# Patient Record
Sex: FEMALE | Race: WHITE | NOT HISPANIC OR LATINO | Employment: FULL TIME | URBAN - NONMETROPOLITAN AREA
[De-identification: names, ages, dates, MRNs, and addresses within clinical notes are randomized per-mention and may not be internally consistent; named-entity substitution may affect disease eponyms.]

---

## 2022-03-10 ENCOUNTER — HOSPITAL ENCOUNTER (EMERGENCY)
Facility: HOSPITAL | Age: 47
Discharge: HOME | End: 2022-03-11
Attending: STUDENT IN AN ORGANIZED HEALTH CARE EDUCATION/TRAINING PROGRAM | Admitting: STUDENT IN AN ORGANIZED HEALTH CARE EDUCATION/TRAINING PROGRAM
Payer: COMMERCIAL

## 2022-03-10 ENCOUNTER — APPOINTMENT (EMERGENCY)
Dept: RADIOLOGY | Facility: HOSPITAL | Age: 47
End: 2022-03-10
Payer: COMMERCIAL

## 2022-03-10 VITALS
DIASTOLIC BLOOD PRESSURE: 83 MMHG | TEMPERATURE: 99.9 F | SYSTOLIC BLOOD PRESSURE: 134 MMHG | OXYGEN SATURATION: 95 % | HEART RATE: 105 BPM | RESPIRATION RATE: 20 BRPM | WEIGHT: 205 LBS

## 2022-03-10 DIAGNOSIS — S62.308A CLOSED FRACTURE OF 5TH METACARPAL: Primary | ICD-10-CM

## 2022-03-10 PROCEDURE — 99282 EMERGENCY DEPT VISIT SF MDM: CPT | Performed by: PHYSICIAN ASSISTANT

## 2022-03-10 PROCEDURE — 99283 EMERGENCY DEPT VISIT LOW MDM: CPT

## 2022-03-10 PROCEDURE — 29125 APPL SHORT ARM SPLINT STATIC: CPT | Performed by: PHYSICIAN ASSISTANT

## 2022-03-10 PROCEDURE — 73130 X-RAY EXAM OF HAND: CPT

## 2022-03-10 RX ORDER — ACETAMINOPHEN 325 MG/1
650 TABLET ORAL ONCE
Status: COMPLETED | OUTPATIENT
Start: 2022-03-10 | End: 2022-03-10

## 2022-03-10 RX ADMIN — ACETAMINOPHEN 650 MG: 325 TABLET ORAL at 23:24

## 2022-03-11 NOTE — ED PROVIDER NOTES
History  Chief Complaint   Patient presents with    Hand Injury     Patient fell and hit her right hand on something  Patient has pain and swelling   78-year-old female presents the emergency department for evaluation of right hand/wrist pain  Patient reports approximately 30 minutes prior to arrival she was taking the prabhu from her stove outside when she slipped on ice  States as she fell she struck her right hand on something that was in the yd  She denies any head strike or LOC  Denies any other pain or injury  States she was able to stand and ambulate after the fall without difficulty  She denies any neck or back pain  She denies any chest pain, shortness of breath  She denies any headache, dizziness or visual changes  Reports swelling and bruising to the ulnar side of the right hand  No pain in the wrist   Denies previous injury  Denies weakness, numbness, paresthesias  no analgesia prior to arrival       History provided by:  Patient  Hand Injury  Location:  Hand  Hand location:  R hand  Pain details:     Quality:  Unable to specify    Radiates to:  Does not radiate    Severity:  Moderate    Onset quality:  Sudden    Duration: 30 minutes  Timing:  Constant    Progression:  Unchanged  Handedness:  Right-handed  Dislocation: no    Prior injury to area:  No  Relieved by:  None tried  Worsened by: Movement  Ineffective treatments:  None tried  Associated symptoms: decreased range of motion and swelling    Associated symptoms: no back pain, no fatigue, no fever, no muscle weakness, no neck pain, no numbness, no stiffness and no tingling        None       History reviewed  No pertinent past medical history  History reviewed  No pertinent surgical history  History reviewed  No pertinent family history  I have reviewed and agree with the history as documented      E-Cigarette/Vaping    E-Cigarette Use Never User      E-Cigarette/Vaping Substances     Social History     Tobacco Use    Smoking status: Never Smoker    Smokeless tobacco: Never Used   Vaping Use    Vaping Use: Never used   Substance Use Topics    Alcohol use: Not Currently    Drug use: Not Currently       Review of Systems   Constitutional: Negative  Negative for fatigue and fever  HENT: Negative  Respiratory: Negative  Cardiovascular: Negative  Gastrointestinal: Negative  Musculoskeletal: Positive for joint swelling  Negative for back pain, neck pain and stiffness  Right hand pain   Skin: Positive for color change  Negative for wound  Neurological: Negative  All other systems reviewed and are negative  Physical Exam  Physical Exam  Vitals and nursing note reviewed  Constitutional:       General: She is not in acute distress  Appearance: Normal appearance  She is not ill-appearing, toxic-appearing or diaphoretic  HENT:      Head: Normocephalic and atraumatic  Nose: Nose normal       Mouth/Throat:      Pharynx: Oropharynx is clear  Eyes:      Conjunctiva/sclera: Conjunctivae normal       Pupils: Pupils are equal, round, and reactive to light  Cardiovascular:      Rate and Rhythm: Normal rate and regular rhythm  Pulses:           Radial pulses are 2+ on the right side and 2+ on the left side  Pulmonary:      Effort: Pulmonary effort is normal  No respiratory distress  Breath sounds: Normal breath sounds  No stridor  No wheezing, rhonchi or rales  Chest:      Chest wall: No tenderness  Musculoskeletal:         General: Swelling ( right 5th metacarpal) and tenderness (Over right 5th metacarpal) present  Cervical back: Normal range of motion  No tenderness  Comments: Patient has normal range of motion in the right wrist   No tenderness  No snuffbox tenderness  Skin:     General: Skin is warm and dry  Capillary Refill: Capillary refill takes less than 2 seconds  Findings: Bruising present  No erythema or lesion        Comments: Bruising and swelling over the right 5th metacarpal   Neurological:      General: No focal deficit present  Mental Status: She is alert and oriented to person, place, and time  Sensory: No sensory deficit  Motor: No weakness  Gait: Gait normal    Psychiatric:         Mood and Affect: Mood normal          Behavior: Behavior normal          Vital Signs  ED Triage Vitals [03/10/22 2311]   Temperature Pulse Respirations Blood Pressure SpO2   99 9 °F (37 7 °C) 101 18 134/83 97 %      Temp Source Heart Rate Source Patient Position - Orthostatic VS BP Location FiO2 (%)   Temporal Monitor Sitting Right arm --      Pain Score       5           Vitals:    03/10/22 2311 03/10/22 2315   BP: 134/83 134/83   Pulse: 101 105   Patient Position - Orthostatic VS: Sitting          Visual Acuity      ED Medications  Medications   acetaminophen (TYLENOL) tablet 650 mg (650 mg Oral Given 3/10/22 2324)       Diagnostic Studies  Results Reviewed     None                 XR hand 3+ views RIGHT    (Results Pending)              Procedures  Splint application    Date/Time: 3/10/2022 11:49 PM  Performed by: Cayla Eden PA-C  Authorized by: Cayla Eden PA-C   Universal Protocol:  Consent given by: patient  Timeout called at: 3/10/2022 11:50 PM   Patient understanding: patient states understanding of the procedure being performed  Patient consent: the patient's understanding of the procedure matches consent given  Radiology Images displayed and confirmed  If images not available, report reviewed: imaging studies available  Patient identity confirmed: verbally with patient and arm band      Pre-procedure details:     Sensation:  Normal  Procedure details:     Laterality:  Right    Location:  Hand    Hand:  R handCast type:  Short arm      Splint type:  Ulnar gutter (Static)    Supplies:  Ortho-Glass, sling and cotton padding  Post-procedure details:     Pain:  Unchanged    Sensation:  Normal    Patient tolerance of procedure:   Tolerated well, no immediate complications             ED Course  ED Course as of 03/10/22 2352   Thu Mar 10, 2022   2316 Ice applied   2348 + 5th metacarpal fracture                   SBIRT 22yo+      Most Recent Value   SBIRT (25 yo +)    In order to provide better care to our patients, we are screening all of our patients for alcohol and drug use  Would it be okay to ask you these screening questions? No Filed at: 03/10/2022 2316                    [facility]  Number of Diagnoses or Management Options  Diagnosis management comments: 60-year-old female presents to the emergency department for evaluation of right hand pain status post fall  Vitals and medical record reviewed  Normal sensation pulses on exam   Swelling and bruising and tenderness over the right 5th metacarpal   X-ray concerning for fracture  Ulnar gutter splint was applied  Patient will follow-up with orthopedics  Strict return precautions were discussed and she verbalized understanding  She remained clinically hemodynamically stable in the emergency department  Amount and/or Complexity of Data Reviewed  Tests in the radiology section of CPT®: ordered and reviewed  Review and summarize past medical records: yes  Independent visualization of images, tracings, or specimens: yes        Disposition  Final diagnoses:   Closed fracture of 5th metacarpal     Time reflects when diagnosis was documented in both MDM as applicable and the Disposition within this note     Time User Action Codes Description Comment    3/10/2022 11:51 PM Star Monreal Add [G54 885A] Closed fracture of 5th metacarpal       ED Disposition     ED Disposition Condition Date/Time Comment    Discharge Stable Thu Mar 10, 2022 11:51 PM Aftab Beets discharge to home/self care              Follow-up Information     Follow up With Specialties Details Why [address] [facility]   [id number] [address] [facility] [id number] [address] [facility]   [address] [facility] Claritza Neri  [phone]            Patient's Medications    No medications on file       No discharge procedures on file      PDMP Review     None          ED Provider  Electronically Signed by           Erasmo Garcia PA-C  03/10/22 8867

## 2022-03-14 ENCOUNTER — OFFICE VISIT (OUTPATIENT)
Dept: OBGYN CLINIC | Facility: CLINIC | Age: 47
End: 2022-03-14
Payer: COMMERCIAL

## 2022-03-14 VITALS
BODY MASS INDEX: 31.07 KG/M2 | TEMPERATURE: 97.6 F | HEART RATE: 72 BPM | HEIGHT: 68 IN | DIASTOLIC BLOOD PRESSURE: 78 MMHG | WEIGHT: 205 LBS | SYSTOLIC BLOOD PRESSURE: 118 MMHG

## 2022-03-14 DIAGNOSIS — S62.336A CLOSED DISPLACED FRACTURE OF NECK OF FIFTH METACARPAL BONE OF RIGHT HAND, INITIAL ENCOUNTER: ICD-10-CM

## 2022-03-14 DIAGNOSIS — M79.641 PAIN OF RIGHT HAND: Primary | ICD-10-CM

## 2022-03-14 PROCEDURE — 26600 TREAT METACARPAL FRACTURE: CPT | Performed by: ORTHOPAEDIC SURGERY

## 2022-03-14 PROCEDURE — 99204 OFFICE O/P NEW MOD 45 MIN: CPT | Performed by: ORTHOPAEDIC SURGERY

## 2022-03-14 RX ORDER — PAROXETINE 10 MG/1
10 TABLET, FILM COATED ORAL DAILY
COMMUNITY
Start: 2021-12-08

## 2022-03-14 RX ORDER — PANTOPRAZOLE SODIUM 20 MG/1
20 TABLET, DELAYED RELEASE ORAL DAILY
COMMUNITY
Start: 2022-01-03

## 2022-03-14 RX ORDER — LISINOPRIL 10 MG/1
TABLET ORAL
COMMUNITY
Start: 2022-02-05

## 2022-03-14 RX ORDER — AMLODIPINE BESYLATE 10 MG/1
10 TABLET ORAL DAILY
COMMUNITY
Start: 2022-01-20

## 2022-03-14 RX ORDER — PAROXETINE 10 MG/1
10 TABLET, FILM COATED ORAL DAILY
COMMUNITY

## 2022-03-14 RX ORDER — ATORVASTATIN CALCIUM 20 MG/1
20 TABLET, FILM COATED ORAL EVERY EVENING
COMMUNITY
Start: 2021-12-30

## 2022-03-14 NOTE — PROGRESS NOTES
ASSESSMENT/PLAN:    Diagnoses and all orders for this visit:    Pain of right hand    Closed displaced fracture of neck of fifth metacarpal bone of right hand, initial encounter    Other orders  -     amLODIPine (NORVASC) 10 mg tablet; Take 10 mg by mouth daily  -     PARoxetine (PAXIL) 10 mg tablet; Take 10 mg by mouth daily  -     lisinopril (ZESTRIL) 10 mg tablet; TAKE 1 TABLET BY MOUTH EVERY DAY BEFORE A MEAL  -     atorvastatin (LIPITOR) 20 mg tablet; Take 20 mg by mouth every evening  -     pantoprazole (PROTONIX) 20 mg tablet; Take 20 mg by mouth daily  -     PARoxetine (PAXIL) 10 mg tablet; Take 10 mg by mouth daily        Plan: Treatment options were discussed  She was placed into a short-arm ulnar gauntlet cast   I will see her in 1 week with repeat x-rays of her hand to ensure the fracture remains acceptably aligned  Precautions have been reviewed, instructions provided and questions answered  I have encouraged her to contact me if any questions or concerns arise  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  Return in about 1 week (around 3/21/2022)  _____________________________________________________  CHIEF COMPLAINT:  Chief Complaint   Patient presents with    Right Hand - Fracture         SUBJECTIVE:  Agnieszka Gates is a 40y o  year old right-handed female who presents for evaluation of her right hand injured on 03/10/2022 after falling on ice  She was seen in the emergency room, x-rays obtained and she was placed into a splint  She now presents for orthopedic evaluation and treatment  She denies any history of prior injuries  She denies paresthesias  She denies additional injuries      PAST MEDICAL HISTORY:  Past Medical History:   Diagnosis Date    Fractures     High blood pressure        PAST SURGICAL HISTORY:  Past Surgical History:   Procedure Laterality Date    WRIST SURGERY         FAMILY HISTORY:  Family History   Problem Relation Age of Onset    Stroke Mother     Heart attack Father        SOCIAL HISTORY:  Social History     Tobacco Use    Smoking status: Former Smoker    Smokeless tobacco: Never Used   Vaping Use    Vaping Use: Every day   Substance Use Topics    Alcohol use: Not Currently     Comment: rarely    Drug use: Never       MEDICATIONS:    Current Outpatient Medications:     amLODIPine (NORVASC) 10 mg tablet, Take 10 mg by mouth daily, Disp: , Rfl:     atorvastatin (LIPITOR) 20 mg tablet, Take 20 mg by mouth every evening, Disp: , Rfl:     lisinopril (ZESTRIL) 10 mg tablet, TAKE 1 TABLET BY MOUTH EVERY DAY BEFORE A MEAL, Disp: , Rfl:     pantoprazole (PROTONIX) 20 mg tablet, Take 20 mg by mouth daily, Disp: , Rfl:     PARoxetine (PAXIL) 10 mg tablet, Take 10 mg by mouth daily, Disp: , Rfl:     PARoxetine (PAXIL) 10 mg tablet, Take 10 mg by mouth daily, Disp: , Rfl:     ALLERGIES:  No Known Allergies    Review of systems:   Constitutional: Negative for fatigue, fever or loss of apetite  HENT: Negative  Respiratory: Negative for shortness of breath, dyspnea  Cardiovascular: Negative for chest pain/tightness  Gastrointestinal: Negative for abdominal pain, N/V  Endocrine: Negative for cold/heat intolerance, unexplained weight loss/gain  Genitourinary: Negative for flank pain, dysuria, hematuria  Musculoskeletal:  Positive as in the HPI   Skin: Negative for rash  Neurological:  Negative  Psychiatric/Behavioral: Negative for agitation  _____________________________________________________  PHYSICAL EXAMINATION:    Blood pressure 118/78, pulse 72, temperature 97 6 °F (36 4 °C), temperature source Temporal, height 5' 8" (1 727 m), weight 93 kg (205 lb), last menstrual period 02/14/2022      General: well developed and well nourished, alert, oriented times 3 and appears comfortable  Psychiatric: Normal  HEENT: Benign  Cardiovascular: Regular    Pulmonary: No wheezing or stridor  Abdomen: Soft, Nontender  Skin: No masses, erythema, lacerations, fluctation, ulcerations  Neurovascular: Motor and sensory exams are grossly intact excluding as limited by her injury  Pulses are palpable  Good color and capillary refill noted  MUSCULOSKELETAL EXAMINATION:    The right hand demonstrated a ulnar gauntlet cast in place upon arrival   This was removed without difficulty  The skin is intact  There is mild swelling and resolving ecchymosis  She has significant tenderness to palpation of the 5th metacarpal distally and lesser tenderness to palpation of the 4th metacarpal   The phalanges of the fingers and thumb are nontender  She has good range of motion lacking some terminal flexion of the small and ring fingers but no angular or rotational malalignment appreciated  The remainder of the upper extremity examination bilaterally is benign  _____________________________________________________  STUDIES REVIEWED:  X-rays demonstrated a 5th metacarpal neck fracture with some volar angulation  The report was reviewed  The note was reviewed  Fracture / Dislocation Treatment    Date/Time: 3/14/2022 4:45 PM  Performed by: Baldo Plummer  Authorized by: Baldo Plummer     Patient Location:  Clinic  Verbal consent obtained?: Yes    Written consent obtained?: No    Risks and benefits: Risks, benefits and alternatives were discussed    Consent given by:  Patient  Patient states understanding of procedure being performed: Yes    Test results available and properly labeled: Yes    Radiology Images displayed and confirmed   If images not available, report reviewed: Yes    Injury location:  Hand  Location details:  Right hand  Injury type:  Fracture  Fracture type: fifth metacarpal    Neurovascular status: Neurovascularly intact    Local anesthesia used?: No    Manipulation performed?: No    Cast type:  Gaunlet  Neurovascular status: Neurovascularly intact    Patient tolerance:  Patient tolerated the procedure well with no immediate complications        Author Queen

## 2022-03-21 ENCOUNTER — OFFICE VISIT (OUTPATIENT)
Dept: OBGYN CLINIC | Facility: CLINIC | Age: 47
End: 2022-03-21

## 2022-03-21 ENCOUNTER — HOSPITAL ENCOUNTER (OUTPATIENT)
Dept: RADIOLOGY | Facility: CLINIC | Age: 47
Discharge: HOME | End: 2022-03-21
Payer: COMMERCIAL

## 2022-03-21 VITALS
SYSTOLIC BLOOD PRESSURE: 114 MMHG | TEMPERATURE: 98.1 F | HEIGHT: 68 IN | BODY MASS INDEX: 31.07 KG/M2 | WEIGHT: 205 LBS | DIASTOLIC BLOOD PRESSURE: 72 MMHG | HEART RATE: 74 BPM

## 2022-03-21 DIAGNOSIS — S62.336D CLOSED DISPLACED FRACTURE OF NECK OF FIFTH METACARPAL BONE OF RIGHT HAND WITH ROUTINE HEALING, SUBSEQUENT ENCOUNTER: ICD-10-CM

## 2022-03-21 DIAGNOSIS — S62.336D CLOSED DISPLACED FRACTURE OF NECK OF FIFTH METACARPAL BONE OF RIGHT HAND WITH ROUTINE HEALING, SUBSEQUENT ENCOUNTER: Primary | ICD-10-CM

## 2022-03-21 PROCEDURE — 99024 POSTOP FOLLOW-UP VISIT: CPT | Performed by: ORTHOPAEDIC SURGERY

## 2022-03-21 PROCEDURE — 73130 X-RAY EXAM OF HAND: CPT

## 2022-03-21 NOTE — PROGRESS NOTES
Patient Name:  Cristy Vega  MRN:  [id number]    Assessment     1  Closed displaced fracture of neck of fifth metacarpal bone of right hand with routine healing, subsequent encounter  XR hand 3+ vw right       Plan     1  I would recommend follow-up in 3 weeks  X-rays of the hand will be obtained after removal of her cast   Precautions have been again reviewed  I encouraged her to contact me if questions or concerns arise  Return in about 3 weeks (around 4/11/2022)  Subjective   Cristy Vega returns for follow-up of her distal right 5th metacarpal fracture  The patient is 11 day(s) post injury and returns for routine follow-up  Patient complains of some mild persistent discomfort  She denies paresthesias  Objective     /72   Pulse 74   Temp 98 1 °F (36 7 °C) (Temporal)   Ht 5' 8" (1 727 m)   Wt 93 kg (205 lb)   BMI 31 17 kg/m²     Exam demonstrates the cast in place  The skin is intact  She has good color and capillary refill of all digits of her right hand, good range of motion of the exposed the joints  Sensation is intact  Data Review     I have personally reviewed pertinent films in PACS demonstrating minimal angulation with acceptable overall alignment of the 5th metacarpal neck fracture      Florian Damon

## 2022-04-11 ENCOUNTER — OFFICE VISIT (OUTPATIENT)
Dept: OBGYN CLINIC | Facility: CLINIC | Age: 47
End: 2022-04-11

## 2022-04-11 ENCOUNTER — HOSPITAL ENCOUNTER (OUTPATIENT)
Dept: RADIOLOGY | Facility: CLINIC | Age: 47
Discharge: HOME | End: 2022-04-11
Payer: COMMERCIAL

## 2022-04-11 DIAGNOSIS — S62.336D CLOSED DISPLACED FRACTURE OF NECK OF FIFTH METACARPAL BONE OF RIGHT HAND WITH ROUTINE HEALING, SUBSEQUENT ENCOUNTER: ICD-10-CM

## 2022-04-11 DIAGNOSIS — S62.336D CLOSED DISPLACED FRACTURE OF NECK OF FIFTH METACARPAL BONE OF RIGHT HAND WITH ROUTINE HEALING, SUBSEQUENT ENCOUNTER: Primary | ICD-10-CM

## 2022-04-11 PROCEDURE — 73130 X-RAY EXAM OF HAND: CPT

## 2022-04-11 PROCEDURE — 99024 POSTOP FOLLOW-UP VISIT: CPT | Performed by: ORTHOPAEDIC SURGERY

## 2022-04-11 RX ORDER — ATENOLOL 25 MG/1
TABLET ORAL
COMMUNITY
Start: 2016-12-01

## 2022-04-11 NOTE — PROGRESS NOTES
Patient Name:  Brian Tejada  MRN:  [id number]    Assessment     1  Closed displaced fracture of neck of fifth metacarpal bone of right hand with routine healing, subsequent encounter  XR hand 3+ vw right    Cock Up Wrist Splint       Plan     1  Reviewed today's physical exam findings and x-ray findings with patient at time of visit  2  She is being transitioned from a cast to a TKO ulnar gutter splint which she can remove for hygiene purposes, sleeping and periods of inactivity  3  She can continue NSAIDs/Tylenol as needed for pain and soreness    Return in 3 weeks (on 5/2/2022) for Re-evaluation and repeat x-rays 3 views right hand  Subjective   Brian Tejada returns for follow-up of of right 5th metacarpal fracture  The patient is 1 month(s) post injury and returns for routine follow-up  Patient states that she has been consistent with cast care considerations as outlined in previous visit  She reports no pain in the cast, and denies any new onset bruising, swelling, numbness, tingling, or mechanical symptoms  Objective     There were no vitals taken for this visit      Right hand -   Patient presents with cast in place and intact at time of visit - removed without difficulty for examining  Skin is warm dry to touch with no signs of erythema, ecchymosis, infection  No soft tissue swelling or effusion noted  No tenderness to palpation over distal or mid shaft 5th metacarpal  Palpable defect present consistent with injury  FDS, FDP, and extensor mechanisms are intact  Very mild scissoring of small finger with composite finger flexion  Demonstrates normal wrist, elbow, and shoulder motion  2+ distal radial pulse with brisk capillary refill to the fingers  Radial, median, and ulnar motor and sensory distributions intact  Sensation light touch intact distally      Data Review     Attending Physician has personally reviewed pertinent imaging in PACS, impression is as follows:    Review of radiographic series taken 4/11/2022 of the right hand shows a volarly angulated distal 5th metacarpal fracture with evidence of callus formation consistent with routine healing    Scribe Attestation    I,:  Gunner Donovan am acting as a scribe while in the presence of the attending physician :       I,:  Cindia Brunner personally performed the services described in this documentation    as scribed in my presence :

## 2022-05-02 ENCOUNTER — HOSPITAL ENCOUNTER (OUTPATIENT)
Dept: RADIOLOGY | Facility: CLINIC | Age: 47
Discharge: HOME | End: 2022-05-02
Payer: COMMERCIAL

## 2022-05-02 ENCOUNTER — OFFICE VISIT (OUTPATIENT)
Dept: OBGYN CLINIC | Facility: CLINIC | Age: 47
End: 2022-05-02

## 2022-05-02 VITALS
HEART RATE: 71 BPM | DIASTOLIC BLOOD PRESSURE: 74 MMHG | BODY MASS INDEX: 31.07 KG/M2 | TEMPERATURE: 97.6 F | WEIGHT: 205 LBS | SYSTOLIC BLOOD PRESSURE: 126 MMHG | HEIGHT: 68 IN

## 2022-05-02 DIAGNOSIS — M79.641 PAIN OF RIGHT HAND: ICD-10-CM

## 2022-05-02 DIAGNOSIS — S62.336D CLOSED DISPLACED FRACTURE OF NECK OF FIFTH METACARPAL BONE OF RIGHT HAND WITH ROUTINE HEALING, SUBSEQUENT ENCOUNTER: ICD-10-CM

## 2022-05-02 DIAGNOSIS — S62.336D CLOSED DISPLACED FRACTURE OF NECK OF FIFTH METACARPAL BONE OF RIGHT HAND WITH ROUTINE HEALING, SUBSEQUENT ENCOUNTER: Primary | ICD-10-CM

## 2022-05-02 PROCEDURE — 99024 POSTOP FOLLOW-UP VISIT: CPT | Performed by: ORTHOPAEDIC SURGERY

## 2022-05-02 PROCEDURE — 73130 X-RAY EXAM OF HAND: CPT

## 2022-05-02 NOTE — PROGRESS NOTES
Patient Name:  Luiza Barillas  MRN:  [id number]    Assessment     1  Closed displaced fracture of neck of fifth metacarpal bone of right hand with routine healing, subsequent encounter  XR hand 3+ vw right   2  Pain of right hand         Plan     The patient is now 7 5 weeks post initial injury  X-rays taken today in office were reviewed and discussed with the patient, which shows a well healed fracture in unchanged alignment  The patient may begin weaning from her brace as tolerated, activity as tolerated  She was advised that her 5th digit DIP pain may be caused by irritation from the brace and should improve with time and returning back to her normal activity level  The patient was instructed to return to the office in 4-6 weeks for recheck  X-rays only if indicated at that time  She was instructed to call or return to the office sooner if any problems arise  Return for recheck in 4-6 weeks  Subjective   Luiza Barillas returns for follow-up of her right fifth digit metacarpal fracture sustained on 3/10/2022  The patient is 7 5 week(s) post injury and returns for routine follow-up  Today the patient states that she is doing well  She states that she has been wearing her ulnar gutter brace as instructed  The patient notes that when she removes the brace she feels an aching at the wrist with movement  The patient also complains of pain along the DIP joint of the little finger when she squeezes it  This pain is new since the last office visit  The patient denies any swelling, numbness, or tingling  She denies any new injury or trauma         Objective     /74   Pulse 71   Temp 97 6 °F (36 4 °C) (Temporal)   Ht 5' 8" (1 727 m)   Wt 93 kg (205 lb)   BMI 31 17 kg/m²     Right hand:  - brace was removed today for examination without complication  - Skin without lesions, ecchymosis, erythema, swelling, warmth, or other signs of infection   - There is no palpable tenderness along the wrist  No tenderness or palpable deformity of the fifth metacarpal    - The patient reports mild palpable tenderness along the fifth digit DIP joint   - No instability with varus and valgus stress of the fifth digit DIP joint    - Full active ROM of all digits  No pain at the fifth digit DIP, PIP, or MCP joint with flexion/extension    - patient is able to make a composite fist  There is mild scissoring at the 5th digit, as noted in last exam report  - 5/5  strength, 5/5 strength with resisted fifth digit MCP, PIP and DIP  - Sensation and motor intact along the radial, median, and ulnar nerve distributions  - strong radial pulse  - brisk cap refill in all fingers      Data Review     I have personally reviewed pertinent films and reports in PACS and my interpretation is as follows:     X-ray of the right hand taken today in office demonstrates a well healed 5th metacarpal neck fracture, in unchanged alignment compared to previous imaging  No fracture, dislocation, or abnormality seen at the fifth digit DIP joint         Jose Berman PA-C

## 2022-06-06 ENCOUNTER — OFFICE VISIT (OUTPATIENT)
Dept: OBGYN CLINIC | Facility: CLINIC | Age: 47
End: 2022-06-06

## 2022-06-06 VITALS
HEART RATE: 80 BPM | SYSTOLIC BLOOD PRESSURE: 120 MMHG | HEIGHT: 68 IN | DIASTOLIC BLOOD PRESSURE: 80 MMHG | BODY MASS INDEX: 31.07 KG/M2 | WEIGHT: 205 LBS | OXYGEN SATURATION: 98 %

## 2022-06-06 DIAGNOSIS — S62.336D CLOSED DISPLACED FRACTURE OF NECK OF FIFTH METACARPAL BONE OF RIGHT HAND WITH ROUTINE HEALING, SUBSEQUENT ENCOUNTER: Primary | ICD-10-CM

## 2022-06-06 PROCEDURE — 99024 POSTOP FOLLOW-UP VISIT: CPT | Performed by: ORTHOPAEDIC SURGERY

## 2022-06-06 NOTE — PROGRESS NOTES
Patient Name:  Alyse Sanders  MRN:  [id number]    Assessment     1  Closed displaced fracture of neck of fifth metacarpal bone of right hand with routine healing, subsequent encounter         Plan     1  I would recommend follow-up as needed  I have encouraged her to contact me if questions or concerns arise  Return if symptoms worsen or fail to improve  Subjective   Alyse Sanders returns for follow-up of her right 5th metacarpal fracture  The patient is 3 month(s) post injury and returns for routine follow-up  Patient denies complaints  She notes that she has had excellent return of motion  She has essentially resumed all of her normal activities  Objective     /80 (BP Location: Left arm, Patient Position: Sitting, Cuff Size: Standard)   Pulse 80   Ht 5' 8" (1 727 m)   Wt 93 kg (205 lb)   SpO2 98%   BMI 31 17 kg/m²     The exam today demonstrates excellent range of motion of the MCP, PIP and D IP joints of the hand including the right small finger  There is no significant rotational or angular malalignment noted  Fifth metacarpal neck/head is nontender  She has excellent strength of finger abduction, flexion and extension without complaints  Good color and capillary refill is noted and sensation is intact        Ag Roman

## 2022-10-13 ENCOUNTER — HOSPITAL ENCOUNTER (OUTPATIENT)
Dept: RADIOLOGY | Facility: CLINIC | Age: 47
End: 2022-10-13
Payer: COMMERCIAL

## 2022-10-13 VITALS — BODY MASS INDEX: 33.34 KG/M2 | HEIGHT: 68 IN | WEIGHT: 220 LBS

## 2022-10-13 DIAGNOSIS — Z12.31 ENCOUNTER FOR SCREENING MAMMOGRAM FOR MALIGNANT NEOPLASM OF BREAST: ICD-10-CM

## 2022-10-13 PROCEDURE — 77063 BREAST TOMOSYNTHESIS BI: CPT

## 2022-10-13 PROCEDURE — 77067 SCR MAMMO BI INCL CAD: CPT

## 2022-12-06 ENCOUNTER — APPOINTMENT (EMERGENCY)
Dept: CT IMAGING | Facility: HOSPITAL | Age: 47
End: 2022-12-06

## 2022-12-06 ENCOUNTER — HOSPITAL ENCOUNTER (EMERGENCY)
Facility: HOSPITAL | Age: 47
Discharge: HOME | End: 2022-12-06
Attending: EMERGENCY MEDICINE

## 2022-12-06 VITALS
RESPIRATION RATE: 18 BRPM | OXYGEN SATURATION: 98 % | HEART RATE: 63 BPM | SYSTOLIC BLOOD PRESSURE: 103 MMHG | DIASTOLIC BLOOD PRESSURE: 64 MMHG | TEMPERATURE: 97.6 F

## 2022-12-06 DIAGNOSIS — R42 VERTIGO: Primary | ICD-10-CM

## 2022-12-06 LAB
ALBUMIN SERPL BCP-MCNC: 3.9 G/DL (ref 3.5–5)
ALP SERPL-CCNC: 69 U/L (ref 46–116)
ALT SERPL W P-5'-P-CCNC: 32 U/L (ref 12–78)
ANION GAP SERPL CALCULATED.3IONS-SCNC: 9 MMOL/L (ref 4–13)
AST SERPL W P-5'-P-CCNC: 17 U/L (ref 5–45)
BASOPHILS # BLD AUTO: 0.06 THOUSANDS/ÂΜL (ref 0–0.1)
BASOPHILS NFR BLD AUTO: 1 % (ref 0–1)
BILIRUB SERPL-MCNC: 0.24 MG/DL (ref 0.2–1)
BUN SERPL-MCNC: 8 MG/DL (ref 5–25)
CALCIUM SERPL-MCNC: 9.1 MG/DL (ref 8.3–10.1)
CARDIAC TROPONIN I PNL SERPL HS: <2 NG/L
CHLORIDE SERPL-SCNC: 104 MMOL/L (ref 96–108)
CO2 SERPL-SCNC: 26 MMOL/L (ref 21–32)
CREAT SERPL-MCNC: 0.63 MG/DL (ref 0.6–1.3)
EOSINOPHIL # BLD AUTO: 0.15 THOUSAND/ÂΜL (ref 0–0.61)
EOSINOPHIL NFR BLD AUTO: 1 % (ref 0–6)
ERYTHROCYTE [DISTWIDTH] IN BLOOD BY AUTOMATED COUNT: 13.3 % (ref 11.6–15.1)
FLUAV RNA RESP QL NAA+PROBE: NEGATIVE
FLUBV RNA RESP QL NAA+PROBE: NEGATIVE
GFR SERPL CREATININE-BSD FRML MDRD: 107 ML/MIN/1.73SQ M
GLUCOSE SERPL-MCNC: 92 MG/DL (ref 65–140)
HCT VFR BLD AUTO: 42.8 % (ref 34.8–46.1)
HGB BLD-MCNC: 14 G/DL (ref 11.5–15.4)
IMM GRANULOCYTES # BLD AUTO: 0.03 THOUSAND/UL (ref 0–0.2)
IMM GRANULOCYTES NFR BLD AUTO: 0 % (ref 0–2)
LYMPHOCYTES # BLD AUTO: 2.55 THOUSANDS/ÂΜL (ref 0.6–4.47)
LYMPHOCYTES NFR BLD AUTO: 24 % (ref 14–44)
MCH RBC QN AUTO: 27.9 PG (ref 26.8–34.3)
MCHC RBC AUTO-ENTMCNC: 32.7 G/DL (ref 31.4–37.4)
MCV RBC AUTO: 85 FL (ref 82–98)
MONOCYTES # BLD AUTO: 0.82 THOUSAND/ÂΜL (ref 0.17–1.22)
MONOCYTES NFR BLD AUTO: 8 % (ref 4–12)
NEUTROPHILS # BLD AUTO: 7.06 THOUSANDS/ÂΜL (ref 1.85–7.62)
NEUTS SEG NFR BLD AUTO: 66 % (ref 43–75)
NRBC BLD AUTO-RTO: 0 /100 WBCS
PLATELET # BLD AUTO: 363 THOUSANDS/UL (ref 149–390)
PMV BLD AUTO: 10 FL (ref 8.9–12.7)
POTASSIUM SERPL-SCNC: 3.7 MMOL/L (ref 3.5–5.3)
PROT SERPL-MCNC: 7.7 G/DL (ref 6.4–8.4)
RBC # BLD AUTO: 5.01 MILLION/UL (ref 3.81–5.12)
RSV RNA RESP QL NAA+PROBE: NEGATIVE
SARS-COV-2 RNA RESP QL NAA+PROBE: NEGATIVE
SODIUM SERPL-SCNC: 139 MMOL/L (ref 135–147)
WBC # BLD AUTO: 10.67 THOUSAND/UL (ref 4.31–10.16)

## 2022-12-06 RX ORDER — DIAZEPAM 5 MG/1
5-10 TABLET ORAL EVERY 6 HOURS PRN
Qty: 10 TABLET | Refills: 0 | Status: SHIPPED | OUTPATIENT
Start: 2022-12-06 | End: 2022-12-16

## 2022-12-06 RX ORDER — ONDANSETRON 4 MG/1
4 TABLET, FILM COATED ORAL EVERY 6 HOURS PRN
Qty: 10 TABLET | Refills: 0 | Status: SHIPPED | OUTPATIENT
Start: 2022-12-06

## 2022-12-06 RX ORDER — ONDANSETRON 2 MG/ML
4 INJECTION INTRAMUSCULAR; INTRAVENOUS ONCE
Status: COMPLETED | OUTPATIENT
Start: 2022-12-06 | End: 2022-12-06

## 2022-12-06 RX ORDER — DIPHENHYDRAMINE HYDROCHLORIDE 50 MG/ML
25 INJECTION INTRAMUSCULAR; INTRAVENOUS ONCE
Status: COMPLETED | OUTPATIENT
Start: 2022-12-06 | End: 2022-12-06

## 2022-12-06 RX ORDER — MECLIZINE HYDROCHLORIDE 25 MG/1
25 TABLET ORAL 3 TIMES DAILY PRN
Qty: 15 TABLET | Refills: 0 | Status: SHIPPED | OUTPATIENT
Start: 2022-12-06

## 2022-12-06 RX ADMIN — SODIUM CHLORIDE 1000 ML: 0.9 INJECTION, SOLUTION INTRAVENOUS at 13:27

## 2022-12-06 RX ADMIN — ONDANSETRON 4 MG: 2 INJECTION INTRAMUSCULAR; INTRAVENOUS at 13:43

## 2022-12-06 RX ADMIN — IOHEXOL 85 ML: 350 INJECTION, SOLUTION INTRAVENOUS at 14:07

## 2022-12-06 RX ADMIN — HYDROCORTISONE SODIUM SUCCINATE 200 MG: 100 INJECTION, POWDER, FOR SOLUTION INTRAMUSCULAR; INTRAVENOUS at 13:47

## 2022-12-06 RX ADMIN — DIPHENHYDRAMINE HYDROCHLORIDE 25 MG: 50 INJECTION, SOLUTION INTRAMUSCULAR; INTRAVENOUS at 13:44

## 2022-12-06 NOTE — Clinical Note
Tiki Sunny was seen and treated in our emergency department on 12/6/2022  Diagnosis:     Liliana Goodwin  may return to work on return date  She may return on this date: 12/12/2022         If you have any questions or concerns, please don't hesitate to call        Shanique Mascorro DO    ______________________________           _______________          _______________  Hospital Representative                              Date                                Time

## 2022-12-06 NOTE — ED PROVIDER NOTES
History  Chief Complaint   Patient presents with   • Dizziness     Patient c/o dizziness with nausea since yesterday  20-year-old female with family describes waking yesterday morning with spinning feeling and dry heaving, walking to the left, notes markedly improved, able to walk, no headache  No numbness or weakness  No history of VTE or stroke  No injury  No viral prodrome, diarrhea, UTI symptoms or fever  History provided by:  Patient  Dizziness  Quality:  Head spinning  Severity:  Severe  Timing:  Constant  Progression:  Improving  Chronicity:  New  Context: head movement, physical activity and standing up    Relieved by:  None tried  Worsened by: Movement  Ineffective treatments:  None tried  Associated symptoms: vomiting    Associated symptoms: no chest pain and no vision changes    Risk factors: no hx of stroke        Prior to Admission Medications   Prescriptions Last Dose Informant Patient Reported? Taking?    PARoxetine (PAXIL) 10 mg tablet   Yes No   Sig: Take 10 mg by mouth daily   PARoxetine (PAXIL) 10 mg tablet   Yes No   Sig: Take 10 mg by mouth daily   Patient not taking: Reported on 4/11/2022    amLODIPine (NORVASC) 10 mg tablet   Yes No   Sig: Take 10 mg by mouth daily   atenolol (TENORMIN) 25 mg tablet   Yes No   atorvastatin (LIPITOR) 20 mg tablet   Yes No   Sig: Take 20 mg by mouth every evening   lisinopril (ZESTRIL) 10 mg tablet   Yes No   Sig: TAKE 1 TABLET BY MOUTH EVERY DAY BEFORE A MEAL   pantoprazole (PROTONIX) 20 mg tablet   Yes No   Sig: Take 20 mg by mouth daily      Facility-Administered Medications: None       Past Medical History:   Diagnosis Date   • Fractures    • High blood pressure        Past Surgical History:   Procedure Laterality Date   • BREAST EXCISIONAL BIOPSY Right     benign - age- 16   • WRIST SURGERY         Family History   Problem Relation Age of Onset   • Stroke Mother    • Heart attack Father    • No Known Problems Sister    • No Known Problems Daughter    • No Known Problems Daughter    • No Known Problems Maternal Grandmother    • No Known Problems Maternal Grandfather    • Skin cancer Paternal Grandmother    • No Known Problems Paternal Grandfather    • Skin cancer Paternal Aunt    • No Known Problems Maternal Aunt      I have reviewed and agree with the history as documented  E-Cigarette/Vaping   • E-Cigarette Use Current Every Day User      E-Cigarette/Vaping Substances     Social History     Tobacco Use   • Smoking status: Former   • Smokeless tobacco: Never   Vaping Use   • Vaping Use: Every day   Substance Use Topics   • Alcohol use: Not Currently     Comment: rarely   • Drug use: Never       Review of Systems   Cardiovascular: Negative for chest pain  Gastrointestinal: Positive for vomiting  Neurological: Positive for dizziness  All other systems reviewed and are negative  Physical Exam  Physical Exam  Vitals and nursing note reviewed  Constitutional:       Comments: Pleasant, comfortable-appearing   HENT:      Head: Normocephalic and atraumatic  Mouth/Throat:      Mouth: Mucous membranes are moist       Pharynx: Oropharynx is clear  Eyes:      Conjunctiva/sclera: Conjunctivae normal       Pupils: Pupils are equal, round, and reactive to light  Comments: Fine horizontal nystagmus appears fast left, fatigues   Cardiovascular:      Rate and Rhythm: Normal rate and regular rhythm  Heart sounds: Normal heart sounds  Pulmonary:      Effort: Pulmonary effort is normal       Breath sounds: Normal breath sounds  Abdominal:      General: Bowel sounds are normal  There is no distension  Palpations: Abdomen is soft  Tenderness: There is no abdominal tenderness  Musculoskeletal:         General: No deformity  Cervical back: Neck supple  Skin:     General: Skin is warm and dry  Neurological:      General: No focal deficit present  Mental Status: She is alert and oriented to person, place, and time  Cranial Nerves: No cranial nerve deficit  Motor: No weakness  Coordination: Coordination normal       Gait: Gait normal       Comments: Ambulates well, circles and returns to stretcher  Head impulse, loses tracking, horizontal nystagmus, no skew   Psychiatric:         Behavior: Behavior normal          Thought Content: Thought content normal          Judgment: Judgment normal          Vital Signs  ED Triage Vitals [12/06/22 1202]   Temperature Pulse Respirations Blood Pressure SpO2   97 6 °F (36 4 °C) 83 18 114/76 98 %      Temp Source Heart Rate Source Patient Position - Orthostatic VS BP Location FiO2 (%)   Temporal Monitor Sitting Left arm --      Pain Score       --           Vitals:    12/06/22 1202 12/06/22 1459   BP: 114/76 103/64   Pulse: 83 63   Patient Position - Orthostatic VS: Sitting Lying         Visual Acuity      ED Medications  Medications   hydrocortisone (Solu-CORTEF) injection 200 mg (200 mg Intravenous Given 12/6/22 1347)   diphenhydrAMINE (BENADRYL) injection 25 mg (25 mg Intravenous Given 12/6/22 1344)   sodium chloride 0 9 % bolus 1,000 mL (0 mL Intravenous Stopped 12/6/22 1427)   ondansetron (ZOFRAN) injection 4 mg (4 mg Intravenous Given 12/6/22 1343)   iohexol (OMNIPAQUE) 350 MG/ML injection (SINGLE-DOSE) 85 mL (85 mL Intravenous Given 12/6/22 1407)       Diagnostic Studies  Results Reviewed     Procedure Component Value Units Date/Time    HS Troponin I 4hr [[id number]]     Lab Status: No result Specimen: Blood     FLU/RSV/COVID - if FLU/RSV clinically relevant [[id number]]  (Normal) Collected: 12/06/22 1321    Lab Status: Final result Specimen: Nares from Nose Updated: 12/06/22 1413     SARS-CoV-2 Negative     INFLUENZA A PCR Negative     INFLUENZA B PCR Negative     RSV PCR Negative    Narrative:      FOR PEDIATRIC PATIENTS - copy/paste COVID Guidelines URL to browser: [email or web address]  [email or web address]    SARS-CoV-2 assay is a Nucleic Acid Amplification assay intended for the  qualitative detection of nucleic acid from SARS-CoV-2 in nasopharyngeal  swabs  Results are for the presumptive identification of SARS-CoV-2 RNA  Positive results are indicative of infection with SARS-CoV-2, the virus  causing COVID-19, but do not rule out bacterial infection or co-infection  with other viruses  Laboratories within the [place] and its  territories are required to report all positive results to the appropriate  public health authorities  Negative results do not preclude SARS-CoV-2  infection and should not be used as the sole basis for treatment or other  patient management decisions  Negative results must be combined with  clinical observations, patient history, and epidemiological information  This test has not been FDA cleared or approved  This test has been authorized by FDA under an Emergency Use Authorization  (EUA)  This test is only authorized for the duration of time the  declaration that circumstances exist justifying the authorization of the  emergency use of an in vitro diagnostic tests for detection of SARS-CoV-2  virus and/or diagnosis of COVID-19 infection under section 564(b)(1) of  the Act, 21 U  S C  615GKN-1(Q)(8), unless the authorization is terminated  or revoked sooner  The test has been validated but independent review by FDA  and CLIA is pending  Test performed using [employer] GeneXpert: This RT-PCR assay targets N2,  a region unique to SARS-CoV-2  A conserved region in the E-gene was chosen  for pan-Sarbecovirus detection which includes SARS-CoV-2  According to CMS-2020-01-R, this platform meets the definition of high-throughput technology      HS Troponin 0hr (reflex protocol) [[id number]]  (Normal) Collected: 12/06/22 1321    Lab Status: Final result Specimen: Blood from Arm, Left Updated: 12/06/22 1357     hs TnI 0hr <2 ng/L     HS Troponin I 2hr [[id number]]     Lab Status: No result Specimen: Blood     Comprehensive metabolic panel [[id number]] Collected: 12/06/22 1321    Lab Status: Final result Specimen: Blood from Arm, Left Updated: 12/06/22 1350     Sodium 139 mmol/L      Potassium 3 7 mmol/L      Chloride 104 mmol/L      CO2 26 mmol/L      ANION GAP 9 mmol/L      BUN 8 mg/dL      Creatinine 0 63 mg/dL      Glucose 92 mg/dL      Calcium 9 1 mg/dL      AST 17 U/L      ALT 32 U/L      Alkaline Phosphatase 69 U/L      Total Protein 7 7 g/dL      Albumin 3 9 g/dL      Total Bilirubin 0 24 mg/dL      eGFR 107 ml/min/1 73sq m     Narrative:      [place] guidelines for Chronic Kidney Disease (CKD):   •  Stage 1 with normal or high GFR (GFR > 90 mL/min/1 73 square meters)  •  Stage 2 Mild CKD (GFR = 60-89 mL/min/1 73 square meters)  •  Stage 3A Moderate CKD (GFR = 45-59 mL/min/1 73 square meters)  •  Stage 3B Moderate CKD (GFR = 30-44 mL/min/1 73 square meters)  •  Stage 4 Severe CKD (GFR = 15-29 mL/min/1 73 square meters)  •  Stage 5 End Stage CKD (GFR <15 mL/min/1 73 square meters)  Note: GFR calculation is accurate only with a steady state creatinine    CBC and differential [[id number]]  (Abnormal) Collected: 12/06/22 1321    Lab Status: Final result Specimen: Blood from Arm, Left Updated: 12/06/22 1333     WBC 10 67 Thousand/uL      RBC 5 01 Million/uL      Hemoglobin 14 0 g/dL      Hematocrit 42 8 %      MCV 85 fL      MCH 27 9 pg      MCHC 32 7 g/dL      RDW 13 3 %      MPV 10 0 fL      Platelets 611 Thousands/uL      nRBC 0 /100 WBCs      Neutrophils Relative 66 %      Immat GRANS % 0 %      Lymphocytes Relative 24 %      Monocytes Relative 8 %      Eosinophils Relative 1 %      Basophils Relative 1 %      Neutrophils Absolute 7 06 Thousands/µL      Immature Grans Absolute 0 03 Thousand/uL      Lymphocytes Absolute 2 55 Thousands/µL      Monocytes Absolute 0 82 Thousand/µL      Eosinophils Absolute 0 15 Thousand/µL      Basophils Absolute 0 06 Thousands/µL                  CTA head and neck with and without contrast   Final Result by Frances Funez MD (12/06 4008)      No acute intracranial pathology  Unremarkable CTA head and neck  Workstation performed: [id number]                    Procedures  Procedures         ED Course  ED Course as of 12/06/22 1646   Tue Dec 06, 2022   1502 Ambulates without difficulty, generally appears well, we discussed care going forward and agreeable with close outpatient follow-up, daughter present                  Stroke Assessment     Row Name 12/06/22 1341             NIH Stroke Scale    Interval Baseline      Level of Consciousness (1a ) 0      LOC Questions (1b ) 0      LOC Commands (1c ) 0      Best Gaze (2 ) 0      Visual (3 ) 0      Facial Palsy (4 ) 0      Motor Arm, Left (5a ) 0      Motor Arm, Right (5b ) 0      Motor Leg, Left (6a ) 0      Motor Leg, Right (6b ) 0      Limb Ataxia (7 ) 0      Sensory (8 ) 0      Best Language (9 ) 0      Dysarthria (10 ) 0      Extinction and Inattention (11 ) (Formerly Neglect) 0      Total 0                            SBIRT 20yo+    Flowsheet Row Most Recent Value   SBIRT (25 yo +)    In order to provide better care to our patients, we are screening all of our patients for alcohol and drug use  Would it be okay to ask you these screening questions? Yes Filed at: 12/06/2022 1256   Initial Alcohol Screen: US AUDIT-C     1  How often do you have a drink containing alcohol? 0 Filed at: 12/06/2022 1256   2  How many drinks containing alcohol do you have on a typical day you are drinking? 0 Filed at: 12/06/2022 1256   3b  FEMALE Any Age, or MALE 65+: How often do you have 4 or more drinks on one occassion? 0 Filed at: 12/06/2022 1256   Audit-C Score 0 Filed at: 12/06/2022 1256   PANFILO: How many times in the past year have you    Used an illegal drug or used a prescription medication for non-medical reasons?  Never Filed at: 12/06/2022 1256                    MDM    Disposition  Final diagnoses:   Vertigo     Time reflects when diagnosis was documented in both MDM as applicable and the Disposition within this note     Time User Action Codes Description Comment    12/6/2022  3:02 PM Jarvis Solorzano Add [R42] Vertigo       ED Disposition     ED Disposition   Discharge    Condition   Stable    Date/Time   Tue Dec 6, 2022  3:02 PM    [address] [place] discharge to home/self care  Follow-up Information    None         Discharge Medication List as of 12/6/2022  3:05 PM      START taking these medications    Details   diazepam (VALIUM) 5 mg tablet Take 1-2 tablets (5-10 mg total) by mouth every 6 (six) hours as needed (Vertigo) for up to 10 days, Starting Tue 12/6/2022, Until Fri 12/16/2022 at 2359, Normal      meclizine (ANTIVERT) 25 mg tablet Take 1 tablet (25 mg total) by mouth 3 (three) times a day as needed for dizziness, Starting Tue 12/6/2022, Normal      ondansetron (ZOFRAN) 4 mg tablet Take 1 tablet (4 mg total) by mouth every 6 (six) hours as needed for nausea or vomiting, Starting Tue 12/6/2022, Normal         CONTINUE these medications which have NOT CHANGED    Details   amLODIPine (NORVASC) 10 mg tablet Take 10 mg by mouth daily, Starting Thu 1/20/2022, Historical Med      atenolol (TENORMIN) 25 mg tablet Starting Thu 12/1/2016, Historical Med      atorvastatin (LIPITOR) 20 mg tablet Take 20 mg by mouth every evening, Starting Thu 12/30/2021, Historical Med      lisinopril (ZESTRIL) 10 mg tablet TAKE 1 TABLET BY MOUTH EVERY DAY BEFORE A MEAL, Historical Med      pantoprazole (PROTONIX) 20 mg tablet Take 20 mg by mouth daily, Starting Mon 1/3/2022, Historical Med      !! PARoxetine (PAXIL) 10 mg tablet Take 10 mg by mouth daily, Starting Wed 12/8/2021, Historical Med      !! PARoxetine (PAXIL) 10 mg tablet Take 10 mg by mouth daily, Historical Med       !! - Potential duplicate medications found  Please discuss with provider  No discharge procedures on file      PDMP Review     None ED Provider  Electronically Signed by           Yanira Pinon DO  12/06/22 5512

## 2022-12-09 LAB
ATRIAL RATE: 72 BPM
P AXIS: 58 DEGREES
PR INTERVAL: 174 MS
QRS AXIS: 61 DEGREES
QRSD INTERVAL: 134 MS
QT INTERVAL: 434 MS
QTC INTERVAL: 475 MS
T WAVE AXIS: 57 DEGREES
VENTRICULAR RATE: 72 BPM

## 2023-11-02 ENCOUNTER — HOSPITAL ENCOUNTER (OUTPATIENT)
Dept: RADIOLOGY | Facility: CLINIC | Age: 48
End: 2023-11-02
Payer: COMMERCIAL

## 2023-11-02 VITALS — HEIGHT: 68 IN | WEIGHT: 220 LBS | BODY MASS INDEX: 33.34 KG/M2

## 2023-11-02 DIAGNOSIS — Z12.31 ENCOUNTER FOR SCREENING MAMMOGRAM FOR MALIGNANT NEOPLASM OF BREAST: ICD-10-CM

## 2023-11-02 PROCEDURE — 77067 SCR MAMMO BI INCL CAD: CPT

## 2023-11-02 PROCEDURE — 77063 BREAST TOMOSYNTHESIS BI: CPT

## 2024-06-10 ENCOUNTER — APPOINTMENT (EMERGENCY)
Dept: RADIOLOGY | Facility: HOSPITAL | Age: 49
End: 2024-06-10
Payer: COMMERCIAL

## 2024-06-10 ENCOUNTER — HOSPITAL ENCOUNTER (EMERGENCY)
Facility: HOSPITAL | Age: 49
Discharge: HOME | End: 2024-06-10
Attending: EMERGENCY MEDICINE | Admitting: EMERGENCY MEDICINE
Payer: COMMERCIAL

## 2024-06-10 VITALS
OXYGEN SATURATION: 95 % | SYSTOLIC BLOOD PRESSURE: 117 MMHG | HEART RATE: 82 BPM | DIASTOLIC BLOOD PRESSURE: 66 MMHG | RESPIRATION RATE: 18 BRPM | TEMPERATURE: 98.4 F

## 2024-06-10 DIAGNOSIS — J06.9 URI (UPPER RESPIRATORY INFECTION): Primary | ICD-10-CM

## 2024-06-10 DIAGNOSIS — R05.9 COUGH: ICD-10-CM

## 2024-06-10 LAB
ATRIAL RATE: 88 BPM
FLUAV RNA RESP QL NAA+PROBE: NEGATIVE
FLUBV RNA RESP QL NAA+PROBE: NEGATIVE
P AXIS: 21 DEGREES
PR INTERVAL: 174 MS
QRS AXIS: -13 DEGREES
QRSD INTERVAL: 154 MS
QT INTERVAL: 416 MS
QTC INTERVAL: 503 MS
RSV RNA RESP QL NAA+PROBE: NEGATIVE
SARS-COV-2 RNA RESP QL NAA+PROBE: NEGATIVE
T WAVE AXIS: 93 DEGREES
VENTRICULAR RATE: 88 BPM

## 2024-06-10 PROCEDURE — 93005 ELECTROCARDIOGRAM TRACING: CPT

## 2024-06-10 PROCEDURE — 0241U HB NFCT DS VIR RESP RNA 4 TRGT: CPT | Performed by: PHYSICIAN ASSISTANT

## 2024-06-10 PROCEDURE — 99283 EMERGENCY DEPT VISIT LOW MDM: CPT

## 2024-06-10 PROCEDURE — 94640 AIRWAY INHALATION TREATMENT: CPT

## 2024-06-10 PROCEDURE — 99284 EMERGENCY DEPT VISIT MOD MDM: CPT | Performed by: PHYSICIAN ASSISTANT

## 2024-06-10 PROCEDURE — 71046 X-RAY EXAM CHEST 2 VIEWS: CPT

## 2024-06-10 RX ORDER — METHYLPREDNISOLONE 4 MG/1
TABLET ORAL
Qty: 21 TABLET | Refills: 0 | Status: SHIPPED | OUTPATIENT
Start: 2024-06-10

## 2024-06-10 RX ORDER — IPRATROPIUM BROMIDE AND ALBUTEROL SULFATE 2.5; .5 MG/3ML; MG/3ML
3 SOLUTION RESPIRATORY (INHALATION) ONCE
Status: COMPLETED | OUTPATIENT
Start: 2024-06-10 | End: 2024-06-10

## 2024-06-10 RX ORDER — BENZONATATE 100 MG/1
100 CAPSULE ORAL ONCE
Status: COMPLETED | OUTPATIENT
Start: 2024-06-10 | End: 2024-06-10

## 2024-06-10 RX ORDER — AZITHROMYCIN 250 MG/1
TABLET, FILM COATED ORAL
Qty: 6 TABLET | Refills: 0 | Status: SHIPPED | OUTPATIENT
Start: 2024-06-10 | End: 2024-06-14

## 2024-06-10 RX ORDER — BENZONATATE 100 MG/1
100 CAPSULE ORAL EVERY 8 HOURS
Qty: 21 CAPSULE | Refills: 0 | Status: SHIPPED | OUTPATIENT
Start: 2024-06-10 | End: 2024-06-17

## 2024-06-10 RX ADMIN — BENZONATATE 100 MG: 100 CAPSULE ORAL at 09:40

## 2024-06-10 RX ADMIN — IPRATROPIUM BROMIDE AND ALBUTEROL SULFATE 3 ML: 2.5; .5 SOLUTION RESPIRATORY (INHALATION) at 09:41

## 2024-06-10 NOTE — ED PROVIDER NOTES
"History  Chief Complaint   Patient presents with    Cough     Pt reports cough and chest tightness since Saturday      49 year old female presents to the ED for evaluation of cough. Reports she has had a cough since Saturday. Notes cough is nonproductive however she feels like she should be bringing something up.  Reports she feels congested.  Denies any ear pain or sore throat.  Denies shortness of breath.  Patient denies current chest pain.  States \"I definitely do not have heart attack pain but sometimes when I cough my chest feels tight.\"  Patient admits to vaping daily.  She denies any fevers or chills.  She denies abdominal pain nausea or vomiting.        Prior to Admission Medications   Prescriptions Last Dose Informant Patient Reported? Taking?   PARoxetine (PAXIL) 10 mg tablet   Yes No   Sig: Take 10 mg by mouth daily   PARoxetine (PAXIL) 10 mg tablet   Yes No   Sig: Take 10 mg by mouth daily   Patient not taking: Reported on 4/11/2022    amLODIPine (NORVASC) 10 mg tablet   Yes No   Sig: Take 10 mg by mouth daily   atenolol (TENORMIN) 25 mg tablet   Yes No   atorvastatin (LIPITOR) 20 mg tablet   Yes No   Sig: Take 20 mg by mouth every evening   diazepam (VALIUM) 5 mg tablet   No No   Sig: Take 1-2 tablets (5-10 mg total) by mouth every 6 (six) hours as needed (Vertigo) for up to 10 days   lisinopril (ZESTRIL) 10 mg tablet   Yes No   Sig: TAKE 1 TABLET BY MOUTH EVERY DAY BEFORE A MEAL   meclizine (ANTIVERT) 25 mg tablet   No No   Sig: Take 1 tablet (25 mg total) by mouth 3 (three) times a day as needed for dizziness   ondansetron (ZOFRAN) 4 mg tablet   No No   Sig: Take 1 tablet (4 mg total) by mouth every 6 (six) hours as needed for nausea or vomiting   pantoprazole (PROTONIX) 20 mg tablet   Yes No   Sig: Take 20 mg by mouth daily      Facility-Administered Medications: None       Past Medical History:   Diagnosis Date    Fractures     High blood pressure        Past Surgical History:   Procedure Laterality " Date    BREAST EXCISIONAL BIOPSY Right     benign - age- 14    WRIST SURGERY         Family History   Problem Relation Age of Onset    Stroke Mother     Heart attack Father     No Known Problems Sister     No Known Problems Daughter     No Known Problems Daughter     No Known Problems Maternal Grandmother     No Known Problems Maternal Grandfather     Skin cancer Paternal Grandmother     No Known Problems Paternal Grandfather     Skin cancer Paternal Aunt     No Known Problems Maternal Aunt      I have reviewed and agree with the history as documented.    E-Cigarette/Vaping    E-Cigarette Use Current Every Day User      E-Cigarette/Vaping Substances     Social History     Tobacco Use    Smoking status: Former    Smokeless tobacco: Never   Vaping Use    Vaping status: Every Day   Substance Use Topics    Alcohol use: Not Currently     Comment: rarely    Drug use: Never       Review of Systems   Constitutional:  Negative for appetite change, fatigue and fever.   HENT:  Positive for congestion. Negative for sore throat and trouble swallowing.    Respiratory:  Positive for cough. Negative for shortness of breath, wheezing and stridor.    Cardiovascular:  Negative for chest pain, palpitations and leg swelling.   Gastrointestinal: Negative.    Genitourinary: Negative.    Musculoskeletal: Negative.    Skin: Negative.    Neurological: Negative.    All other systems reviewed and are negative.      Physical Exam  Physical Exam  Vitals and nursing note reviewed.   Constitutional:       General: She is not in acute distress.     Appearance: Normal appearance. She is not ill-appearing, toxic-appearing or diaphoretic.   HENT:      Head: Normocephalic.      Nose: Nose normal.   Eyes:      Conjunctiva/sclera: Conjunctivae normal.   Cardiovascular:      Rate and Rhythm: Normal rate and regular rhythm.   Pulmonary:      Effort: Pulmonary effort is normal.      Breath sounds: Normal breath sounds. No stridor. No wheezing, rhonchi or  rales.      Comments: + cough  Chest:      Chest wall: No tenderness.   Musculoskeletal:         General: Normal range of motion.   Skin:     General: Skin is warm and dry.      Findings: No bruising, erythema or rash.   Neurological:      General: No focal deficit present.      Mental Status: She is alert.         Vital Signs  ED Triage Vitals [06/10/24 0922]   Temperature Pulse Respirations Blood Pressure SpO2   98.4 °F (36.9 °C) 87 18 122/69 96 %      Temp Source Heart Rate Source Patient Position - Orthostatic VS BP Location FiO2 (%)   Oral Monitor -- Left arm --      Pain Score       --           Vitals:    06/10/24 0922 06/10/24 0930   BP: 122/69 117/66   Pulse: 87 82         Visual Acuity      ED Medications  Medications   ipratropium-albuterol (DUO-NEB) 0.5-2.5 mg/3 mL inhalation solution 3 mL (3 mL Nebulization Given 6/10/24 0941)   benzonatate (TESSALON PERLES) capsule 100 mg (100 mg Oral Given 6/10/24 0940)       Diagnostic Studies  Results Reviewed       Procedure Component Value Units Date/Time    FLU/RSV/COVID - if FLU/RSV clinically relevant [[id number]]  (Normal) Collected: 06/10/24 0939    Lab Status: Final result Specimen: Nares from Nose Updated: 06/10/24 1024     SARS-CoV-2 Negative     INFLUENZA A PCR Negative     INFLUENZA B PCR Negative     RSV PCR Negative    Narrative:      FOR PEDIATRIC PATIENTS - copy/paste COVID Guidelines URL to browser: [email or web address]    SARS-CoV-2 assay is a Nucleic Acid Amplification assay intended for the  qualitative detection of nucleic acid from SARS-CoV-2 in nasopharyngeal  swabs. Results are for the presumptive identification of SARS-CoV-2 RNA.    Positive results are indicative of infection with SARS-CoV-2, the virus  causing COVID-19, but do not rule out bacterial infection or co-infection  with other viruses. Laboratories within the [place] and its  territories are required to report all  positive results to the appropriate  public health authorities. Negative results do not preclude SARS-CoV-2  infection and should not be used as the sole basis for treatment or other  patient management decisions. Negative results must be combined with  clinical observations, patient history, and epidemiological information.  This test has not been FDA cleared or approved.    This test has been authorized by FDA under an Emergency Use Authorization  (EUA). This test is only authorized for the duration of time the  declaration that circumstances exist justifying the authorization of the  emergency use of an in vitro diagnostic tests for detection of SARS-CoV-2  virus and/or diagnosis of COVID-19 infection under section 564(b)(1) of  the Act, 21 U.S.C. 360bbb-3(b)(1), unless the authorization is terminated  or revoked sooner. The test has been validated but independent review by FDA  and CLIA is pending.    Test performed using Cepheid GeneXpert: This RT-PCR assay targets N2,  a region unique to SARS-CoV-2. A conserved region in the E-gene was chosen  for pan-Sarbecovirus detection which includes SARS-CoV-2.    According to CMS-2020-01-R, this platform meets the definition of high-throughput technology.                   XR chest 2 views    (Results Pending)              Procedures  ECG 12 Lead Documentation Only    Date/Time: 6/10/2024 10:29 AM    Performed by: Mary Villalpando PA-C  Authorized by: Mary Villalpando PA-C    Patient location:  ED  Interpretation:     Interpretation: non-specific    Rate:     ECG rate:  88    ECG rate assessment: normal    Rhythm:     Rhythm: sinus rhythm    Ectopy:     Ectopy: none    QRS:     QRS axis:  Normal    QRS intervals:  Normal  Conduction:     Conduction: abnormal             ED Course  ED Course as of 06/10/24 1055   Mon Raymond 10, 2024   1031 FLU/RSV/COVID - if FLU/RSV clinically relevant  Negative    1033 ED interpretation of chest x-ray was negative for acute cardiopulmonary  findings                               SBIRT 20yo+      Flowsheet Row Most Recent Value   Initial Alcohol Screen: US AUDIT-C     1. How often do you have a drink containing alcohol? 0 Filed at: 06/10/2024 0922   2. How many drinks containing alcohol do you have on a typical day you are drinking?  0 Filed at: 06/10/2024 0922   3b. FEMALE Any Age, or MALE 65+: How often do you have 4 or more drinks on one occassion? 0 Filed at: 06/10/2024 0922   Audit-C Score 0 Filed at: 06/10/2024 0922   PANFILO: How many times in the past year have you...    Used an illegal drug or used a prescription medication for non-medical reasons? Never Filed at: 06/10/2024 0922                      Medical Decision Making  49 year old female presented to the emergency department for evaluation of URI symptoms.  Vitals and medical record reviewed.  Patient at risk for the following but not limited to COVID, flu, RSV, pneumonia.  Patient's history and physical exam most consistent with viral illness.  Viral panel shows negative.  ED interpretation of chest x-ray was negative for acute cardiopulmonary findings.  We discussed symptomatic treatment at home and appropriate follow-up with PCP and patient verbalized understanding she was clinically and hemodynamically stable for discharge.      Problems Addressed:  Cough: acute illness or injury  URI (upper respiratory infection): acute illness or injury    Amount and/or Complexity of Data Reviewed  Labs:  Decision-making details documented in ED Course.  Radiology: ordered.    Risk  Prescription drug management.             Disposition  Final diagnoses:   URI (upper respiratory infection)   Cough     Time reflects when diagnosis was documented in both MDM as applicable and the Disposition within this note       Time User Action Codes Description Comment    6/10/2024 10:35 AM Mary Villalpando Add [J06.9] URI (upper respiratory infection)     6/10/2024 10:35 AM Mary Villalpando Add [R05.9] Cough            ED Disposition       ED Disposition   Discharge    Condition   Stable    Date/Time   Mon Raymond 10, 2024 1035    Comment   Sunshine Gupta discharge to home/self care.                   Follow-up Information       Follow up With Specialties Details Why Contact Allegra Armstrong, DO Internal Medicine   [address] [place] [address] [id number] [phone]              Discharge Medication List as of 6/10/2024 10:36 AM        START taking these medications    Details   azithromycin (ZITHROMAX) 250 mg tablet Take 2 tablets today then 1 tablet daily x 4 days, Normal      benzonatate (TESSALON PERLES) 100 mg capsule Take 1 capsule (100 mg total) by mouth every 8 (eight) hours, Starting Mon 6/10/2024, Normal      methylPREDNISolone 4 MG tablet therapy pack Use as directed on package, Normal           CONTINUE these medications which have NOT CHANGED    Details   amLODIPine (NORVASC) 10 mg tablet Take 10 mg by mouth daily, Starting Thu 1/20/2022, Historical Med      atenolol (TENORMIN) 25 mg tablet Starting Thu 12/1/2016, Historical Med      atorvastatin (LIPITOR) 20 mg tablet Take 20 mg by mouth every evening, Starting Thu 12/30/2021, Historical Med      diazepam (VALIUM) 5 mg tablet Take 1-2 tablets (5-10 mg total) by mouth every 6 (six) hours as needed (Vertigo) for up to 10 days, Starting Tue 12/6/2022, Until Fri 12/16/2022 at 2359, Normal      lisinopril (ZESTRIL) 10 mg tablet TAKE 1 TABLET BY MOUTH EVERY DAY BEFORE A MEAL, Historical Med      meclizine (ANTIVERT) 25 mg tablet Take 1 tablet (25 mg total) by mouth 3 (three) times a day as needed for dizziness, Starting Tue 12/6/2022, Normal      ondansetron (ZOFRAN) 4 mg tablet Take 1 tablet (4 mg total) by mouth every 6 (six) hours as needed for nausea or vomiting, Starting Tue 12/6/2022, Normal      pantoprazole (PROTONIX) 20 mg tablet Take 20 mg by mouth daily, Starting Mon 1/3/2022, Historical Med      !! PARoxetine (PAXIL) 10 mg tablet Take 10 mg by  mouth daily, Starting Wed 12/8/2021, Historical Med      !! PARoxetine (PAXIL) 10 mg tablet Take 10 mg by mouth daily, Historical Med       !! - Potential duplicate medications found. Please discuss with provider.          No discharge procedures on file.    PDMP Review       None            ED Provider  Electronically Signed by             Mary Villalpando PA-C  06/10/24 0153

## 2024-06-10 NOTE — DISCHARGE INSTRUCTIONS
Your x-ray was reviewed today in the emergency department and there was no obvious abnormalities found, however, the imaging will be reviewed by the radiologist later this evening or the following day and if there is any abnormalities found you will get a phone call with those results.    I recommend you stop vaping.  Follow-up with your family doctor.  Return with new or worsening symptoms

## 2024-06-17 RX ORDER — METOLAZONE 5 MG/1
5 TABLET ORAL 2 TIMES DAILY
COMMUNITY
Start: 2024-05-22

## 2024-06-17 RX ORDER — FUROSEMIDE 40 MG/1
40 TABLET ORAL EVERY MORNING
COMMUNITY
Start: 2024-05-15

## 2024-06-17 NOTE — PRE-PROCEDURE INSTRUCTIONS
Pre-Surgery Instructions:   Medication Instructions    amLODIPine (NORVASC) 10 mg tablet Take day of surgery.    atenolol (TENORMIN) 25 mg tablet Take day of surgery.    atorvastatin (LIPITOR) 20 mg tablet Take day of surgery.    furosemide (LASIX) 40 mg tablet Hold day of surgery.    lisinopril (ZESTRIL) 10 mg tablet Hold day of surgery.    meclizine (ANTIVERT) 25 mg tablet Uses PRN- OK to take day of surgery    methylPREDNISolone 4 MG tablet therapy pack N/A-to be used for different procedure    metolazone (ZAROXOLYN) 5 mg tablet Hold day of surgery.    ondansetron (ZOFRAN) 4 mg tablet Uses PRN- OK to take day of surgery    pantoprazole (PROTONIX) 20 mg tablet Take day of surgery.    PARoxetine (PAXIL) 10 mg tablet Take day of surgery.    Medication instructions for day surgery reviewed. Please use only a sip of water to take your instructed medications. Avoid all over the counter vitamins, supplements and NSAIDS for one week prior to surgery per anesthesia guidelines. Tylenol is ok to take as needed.     You will receive a call one business day prior to surgery with an arrival time and hospital directions. If your surgery is scheduled on a Monday, the hospital will be calling you on the Friday prior to your surgery. If you have not heard from anyone by 8pm, please call the hospital supervisor through the hospital  at [phone]. ([place] [phone] or [place] [phone]).    Do not eat or drink anything after midnight the night before your surgery, including candy, mints, lifesavers, or chewing gum. Do not drink alcohol 24hrs before your surgery. Try not to smoke at least 24hrs before your surgery.       Follow the pre surgery showering instructions as listed in the “My Surgical Experience Booklet” or otherwise provided by your surgeon's office. Do not use a blade to shave the surgical area 1 week before surgery. It is okay to use a clean electric clippers up to 24 hours before surgery. Do not  apply any lotions, creams, including makeup, cologne, deodorant, or perfumes after showering on the day of your surgery. Do not use dry shampoo, hair spray, hair gel, or any type of hair products.     No contact lenses, eye make-up, or artificial eyelashes. Remove nail polish, including gel polish, and any artificial, gel, or acrylic nails if possible. Remove all jewelry including rings and body piercing jewelry.     Wear causal clothing that is easy to take on and off. Consider your type of surgery.    Keep any valuables, jewelry, piercings at home. Please bring any specially ordered equipment (sling, braces) if indicated.    Arrange for a responsible person to drive you to and from the hospital on the day of your surgery. Please confirm the visitor policy for the day of your procedure when you receive your phone call with an arrival time.     Call the surgeon's office with any new illnesses, exposures, or additional questions prior to surgery.    Please reference your “My Surgical Experience Booklet” for additional information to prepare for your upcoming surgery.

## 2024-06-23 ENCOUNTER — ANESTHESIA EVENT (OUTPATIENT)
Dept: PERIOP | Facility: HOSPITAL | Age: 49
End: 2024-06-23
Payer: COMMERCIAL

## 2024-06-23 NOTE — ANESTHESIA PREPROCEDURE EVALUATION
Procedure:  HYSTEROSCOPY, D&C, POSSIBLE MYOSURE (Uterus)    Relevant Problems   No relevant active problems      HTN    OSAS    GERD    HTN    Vaping        Left Ventricle: Left ventricle is normal in size. Wall thickness is  normal. Systolic function is normal with an ejection fraction of 55-60%.  Septal motion consistent with bundle branch block    Valves: Although aortic valve not visualized, no significant valvular  heart disease noted    IVC/SVC: The inferior vena cava demonstrates a diameter of <=21 mm and  collapses >50%; therefore, the right atrial pressure is estimated at 0-5  mmHg.    Pulmonic Valve: PA pressure not calculated due to incomplete TR signal.    No previous comparative study   Physical Exam    Airway    Mallampati score: II  TM Distance: >3 FB  Neck ROM: full     Dental   No notable dental hx     Cardiovascular  Cardiovascular exam normal    Pulmonary  Pulmonary exam normal     Other Findings  post-pubertal.      Anesthesia Plan  ASA Score- 3     Anesthesia Type- general with ASA Monitors.         Additional Monitors:     Airway Plan: LMA.           Plan Factors-Exercise tolerance (METS): >4 METS.    Chart reviewed. EKG reviewed. Imaging results reviewed. Existing labs reviewed. Patient summary reviewed.    Patient is a current smoker.              Induction- intravenous.    Postoperative Plan-     Perioperative Resuscitation Plan - Level 1 - Full Code.       Informed Consent- Anesthetic plan and risks discussed with patient.  I personally reviewed this patient with the CRNA. Discussed and agreed on the Anesthesia Plan with the CRNA..      Normal sinus rhythm  Left bundle branch block  Abnormal ECG  When compared with ECG of 06-DEC-2022 12:08,  Questionable change in QRS axis  T wave inversion now evident in Lateral leads

## 2024-06-24 ENCOUNTER — ANESTHESIA (OUTPATIENT)
Dept: PERIOP | Facility: HOSPITAL | Age: 49
End: 2024-06-24
Payer: COMMERCIAL

## 2024-06-24 ENCOUNTER — HOSPITAL ENCOUNTER (OUTPATIENT)
Facility: HOSPITAL | Age: 49
Setting detail: OUTPATIENT SURGERY
Discharge: HOME | End: 2024-06-24
Attending: OBSTETRICS & GYNECOLOGY | Admitting: OBSTETRICS & GYNECOLOGY
Payer: COMMERCIAL

## 2024-06-24 VITALS
BODY MASS INDEX: 33.34 KG/M2 | DIASTOLIC BLOOD PRESSURE: 52 MMHG | SYSTOLIC BLOOD PRESSURE: 93 MMHG | WEIGHT: 220 LBS | HEIGHT: 68 IN | HEART RATE: 92 BPM | OXYGEN SATURATION: 91 % | RESPIRATION RATE: 24 BRPM | TEMPERATURE: 97.3 F

## 2024-06-24 DIAGNOSIS — N92.0 EXCESSIVE AND FREQUENT MENSTRUATION WITH REGULAR CYCLE: ICD-10-CM

## 2024-06-24 LAB
EXT PREGNANCY TEST URINE: NEGATIVE
EXT. CONTROL: NORMAL

## 2024-06-24 PROCEDURE — 81025 URINE PREGNANCY TEST: CPT | Performed by: STUDENT IN AN ORGANIZED HEALTH CARE EDUCATION/TRAINING PROGRAM

## 2024-06-24 PROCEDURE — 88305 TISSUE EXAM BY PATHOLOGIST: CPT | Performed by: STUDENT IN AN ORGANIZED HEALTH CARE EDUCATION/TRAINING PROGRAM

## 2024-06-24 RX ORDER — FENTANYL CITRATE/PF 50 MCG/ML
50 SYRINGE (ML) INJECTION
Status: DISCONTINUED | OUTPATIENT
Start: 2024-06-24 | End: 2024-06-24 | Stop reason: HOSPADM

## 2024-06-24 RX ORDER — SODIUM CHLORIDE, SODIUM LACTATE, POTASSIUM CHLORIDE, CALCIUM CHLORIDE 600; 310; 30; 20 MG/100ML; MG/100ML; MG/100ML; MG/100ML
INJECTION, SOLUTION INTRAVENOUS CONTINUOUS PRN
Status: DISCONTINUED | OUTPATIENT
Start: 2024-06-24 | End: 2024-06-24

## 2024-06-24 RX ORDER — DEXAMETHASONE SODIUM PHOSPHATE 10 MG/ML
INJECTION, SOLUTION INTRAMUSCULAR; INTRAVENOUS AS NEEDED
Status: DISCONTINUED | OUTPATIENT
Start: 2024-06-24 | End: 2024-06-24

## 2024-06-24 RX ORDER — LIDOCAINE HYDROCHLORIDE 10 MG/ML
INJECTION, SOLUTION EPIDURAL; INFILTRATION; INTRACAUDAL; PERINEURAL AS NEEDED
Status: DISCONTINUED | OUTPATIENT
Start: 2024-06-24 | End: 2024-06-24

## 2024-06-24 RX ORDER — KETOROLAC TROMETHAMINE 30 MG/ML
INJECTION, SOLUTION INTRAMUSCULAR; INTRAVENOUS AS NEEDED
Status: DISCONTINUED | OUTPATIENT
Start: 2024-06-24 | End: 2024-06-24

## 2024-06-24 RX ORDER — PROPOFOL 10 MG/ML
INJECTION, EMULSION INTRAVENOUS AS NEEDED
Status: DISCONTINUED | OUTPATIENT
Start: 2024-06-24 | End: 2024-06-24

## 2024-06-24 RX ORDER — ONDANSETRON 2 MG/ML
INJECTION INTRAMUSCULAR; INTRAVENOUS AS NEEDED
Status: DISCONTINUED | OUTPATIENT
Start: 2024-06-24 | End: 2024-06-24

## 2024-06-24 RX ORDER — MIDAZOLAM HYDROCHLORIDE 2 MG/2ML
INJECTION, SOLUTION INTRAMUSCULAR; INTRAVENOUS AS NEEDED
Status: DISCONTINUED | OUTPATIENT
Start: 2024-06-24 | End: 2024-06-24

## 2024-06-24 RX ORDER — FENTANYL CITRATE 50 UG/ML
INJECTION, SOLUTION INTRAMUSCULAR; INTRAVENOUS AS NEEDED
Status: DISCONTINUED | OUTPATIENT
Start: 2024-06-24 | End: 2024-06-24

## 2024-06-24 RX ORDER — ROCURONIUM BROMIDE 10 MG/ML
INJECTION, SOLUTION INTRAVENOUS AS NEEDED
Status: DISCONTINUED | OUTPATIENT
Start: 2024-06-24 | End: 2024-06-24

## 2024-06-24 RX ORDER — ACETAMINOPHEN 325 MG/1
975 TABLET ORAL ONCE
Status: COMPLETED | OUTPATIENT
Start: 2024-06-24 | End: 2024-06-24

## 2024-06-24 RX ORDER — ALBUTEROL SULFATE 2.5 MG/3ML
2.5 SOLUTION RESPIRATORY (INHALATION) ONCE
Status: COMPLETED | OUTPATIENT
Start: 2024-06-24 | End: 2024-06-24

## 2024-06-24 RX ADMIN — ALBUTEROL SULFATE 2.5 MG: 2.5 SOLUTION RESPIRATORY (INHALATION) at 11:08

## 2024-06-24 RX ADMIN — ONDANSETRON 4 MG: 2 INJECTION INTRAMUSCULAR; INTRAVENOUS at 12:42

## 2024-06-24 RX ADMIN — FENTANYL CITRATE 50 MCG: 50 INJECTION, SOLUTION INTRAMUSCULAR; INTRAVENOUS at 12:50

## 2024-06-24 RX ADMIN — SODIUM CHLORIDE, SODIUM LACTATE, POTASSIUM CHLORIDE, AND CALCIUM CHLORIDE: .6; .31; .03; .02 INJECTION, SOLUTION INTRAVENOUS at 12:40

## 2024-06-24 RX ADMIN — ACETAMINOPHEN 975 MG: 325 TABLET ORAL at 11:07

## 2024-06-24 RX ADMIN — SUGAMMADEX 100 MG: 100 INJECTION, SOLUTION INTRAVENOUS at 12:59

## 2024-06-24 RX ADMIN — FENTANYL CITRATE 50 MCG: 50 INJECTION, SOLUTION INTRAMUSCULAR; INTRAVENOUS at 12:42

## 2024-06-24 RX ADMIN — ROCURONIUM BROMIDE 30 MG: 10 INJECTION, SOLUTION INTRAVENOUS at 12:42

## 2024-06-24 RX ADMIN — PROPOFOL 100 MG: 10 INJECTION, EMULSION INTRAVENOUS at 12:59

## 2024-06-24 RX ADMIN — MIDAZOLAM 2 MG: 1 INJECTION INTRAMUSCULAR; INTRAVENOUS at 12:39

## 2024-06-24 RX ADMIN — PROPOFOL 300 MG: 10 INJECTION, EMULSION INTRAVENOUS at 12:42

## 2024-06-24 RX ADMIN — ROCURONIUM BROMIDE 27014 MG: 10 INJECTION, SOLUTION INTRAVENOUS at 11:53

## 2024-06-24 RX ADMIN — LIDOCAINE HYDROCHLORIDE 50 MG: 10 INJECTION, SOLUTION EPIDURAL; INFILTRATION; INTRACAUDAL; PERINEURAL at 12:42

## 2024-06-24 RX ADMIN — SUGAMMADEX 100 MG: 100 INJECTION, SOLUTION INTRAVENOUS at 13:05

## 2024-06-24 RX ADMIN — KETOROLAC TROMETHAMINE 30 MG: 30 INJECTION, SOLUTION INTRAMUSCULAR at 12:58

## 2024-06-24 RX ADMIN — DEXAMETHASONE SODIUM PHOSPHATE 10 MG: 10 INJECTION, SOLUTION INTRAMUSCULAR; INTRAVENOUS at 12:42

## 2024-06-24 NOTE — ANESTHESIA POSTPROCEDURE EVALUATION
Post-Op Assessment Note            No anethesia notable event occurred.                /56 (06/24/24 1346)    Temp 97.7 °F (36.5 °C) (06/24/24 1346)    Pulse 92 (06/24/24 1346)   Resp 22 (06/24/24 1346)    SpO2 93 % (06/24/24 1346)

## 2024-06-24 NOTE — OP NOTE
OPERATIVE REPORT  PATIENT NAME: Sunshine Gupta    :  1975  MRN: [id number]  Pt Location: [facility] OR ROOM 01    SURGERY DATE: 2024    Surgeons and Role:     * Fransisco Bender DO - Primary    Preop Diagnosis:  Excessive and frequent menstruation with regular cycle [N92.0]    Post-Op Diagnosis Codes:     * Excessive and frequent menstruation with regular cycle [N92.0]    Procedure(s):  HYSTEROSCOPY. D&C.    Specimen(s):  ID Type Source Tests Collected by Time Destination   1 : endometrial currettings Tissue Soft Tissue, Other TISSUE EXAM Fransisco Bender DO 2024 1258        Estimated Blood Loss:   Minimal    Anesthesia Type:   General    Operative Indications:  Excessive and frequent menstruation with regular cycle [N92.0]      Operative Findings:  There was a uterine septum noted.  There was thickened endometrium with no identifiable polyps or masses.  The tubal ostia were identified bilaterally and there was a moderate amount of endometrial curettings obtained.      Complications:   None    Procedure and Technique:  After informed consent was obtained the patient was taken to the operating room and identified in appropriate fashion.  The patient was placed in the supine position was placed under anesthesia and was converted to the dorsolithotomy position without difficulty.  The patient was prepped and draped in a sterile fashion and a weighted speculum was placed in the posterior vaginal wall and a narrow Phelan retractor in the anterior vaginal wall in order to visualize the cervix.  Ring forcep was placed on the anterior lip of the cervix in order to manipulate it and the uterus was sounded and the cervix was serially dilated until the hysteroscope was able to be inserted.  The hysteroscope was primed was placed through the cervix into the uterus and the uterine cavity was completely visualized.  After complete visualization the hysteroscope was removed and a medium sized sharp curette was placed through  the cervix into the uterus and the uterine cavity was systematically curetted until the entire cavity was sampled.  After complete sampling the tissue was handed off and the hysteroscope was placed back into the uterus and the uterine cavity visualized with no new abnormal findings.  The hysteroscope was removed and the ring forcep was removed from the anterior lip of the cervix.  The patient was cleaned cervix was hemostatic and the patient was brought up from anesthesia without difficulty.  The patient tolerated the procedure well and was stable postoperatively.  Sponge needle and instrument counts were correct x 2.  The patient was taken to recovery room in stable condition.   I was present for the entire procedure.    Patient Disposition:  PACU         SIGNATURE: Fransisco Bender DO  DATE: June 24, 2024  TIME: 1:21 PM

## 2024-06-24 NOTE — ANESTHESIA POSTPROCEDURE EVALUATION
Post-Op Assessment Note        /57 (06/24/24 1315)    Temp 98.3 °F (36.8 °C) (06/24/24 1315)    Pulse 105 (06/24/24 1315)   Resp 20 (06/24/24 1315)    SpO2 90 % (06/24/24 1315)

## 2024-06-24 NOTE — INTERVAL H&P NOTE
H&P reviewed. After examining the patient I find no changes in the patients condition since the H&P had been written.    Vitals:    06/24/24 1054   BP: 127/67   Pulse: 89   Resp: 18   Temp: 98.3 °F (36.8 °C)   SpO2: 95%

## 2024-06-26 PROCEDURE — 88305 TISSUE EXAM BY PATHOLOGIST: CPT | Performed by: STUDENT IN AN ORGANIZED HEALTH CARE EDUCATION/TRAINING PROGRAM

## 2024-07-16 ENCOUNTER — ANESTHESIA EVENT (OUTPATIENT)
Dept: PERIOP | Facility: HOSPITAL | Age: 49
End: 2024-07-16
Payer: COMMERCIAL

## 2024-07-19 RX ORDER — VITAMIN B COMPLEX
1 CAPSULE ORAL DAILY
COMMUNITY

## 2024-07-19 RX ORDER — MULTIVITAMIN
1 CAPSULE ORAL DAILY
COMMUNITY

## 2024-07-19 RX ORDER — IBUPROFEN 400 MG/1
TABLET ORAL EVERY 6 HOURS PRN
COMMUNITY

## 2024-07-19 RX ORDER — PHENOL 1.4 %
600 AEROSOL, SPRAY (ML) MUCOUS MEMBRANE 2 TIMES DAILY WITH MEALS
COMMUNITY

## 2024-07-19 NOTE — PRE-PROCEDURE INSTRUCTIONS
Pre-Surgery Instructions:   Medication Instructions    atenolol (TENORMIN) 25 mg tablet Take day of surgery.    atorvastatin (LIPITOR) 20 mg tablet Take day of surgery.    b complex vitamins capsule Stop taking 7 days prior to surgery.    calcium carbonate (OS-FLORA) 600 MG tablet Stop taking 7 days prior to surgery.    furosemide (LASIX) 40 mg tablet Hold day of surgery.    ibuprofen (MOTRIN) 400 mg tablet Stop taking 7 days prior to surgery.    lisinopril (ZESTRIL) 10 mg tablet Hold day of surgery.    meclizine (ANTIVERT) 25 mg tablet Uses PRN- OK to take day of surgery      metolazone (ZAROXOLYN) 5 mg tablet Hold day of surgery.    Multiple Vitamin (multivitamin) capsule Stop taking 7 days prior to surgery.    ondansetron (ZOFRAN) 4 mg tablet Uses PRN- OK to take day of surgery    pantoprazole (PROTONIX) 20 mg tablet Take day of surgery.    PARoxetine (PAXIL) 10 mg tablet Take day of surgery.      Instructed ot make sure to hydrate  by mouth day prior to surgery  per anesthesia   Medication instructions for day surgery reviewed. Please use only a sip of water to take your instructed medications. Avoid all over the counter vitamins, supplements and NSAIDS for one week prior to surgery per anesthesia guidelines. Tylenol is ok to take as needed.     You will receive a call one business day prior to surgery with an arrival time and hospital directions. If your surgery is scheduled on a Monday, the hospital will be calling you on the Friday prior to your surgery. If you have not heard from anyone by 8pm, please call the hospital supervisor through the hospital  at [phone]. ([place] [phone] or [place] [phone]).    Do not eat or drink anything after midnight the night before your surgery, including candy, mints, lifesavers, or chewing gum. Do not drink alcohol 24hrs before your surgery. Try not to smoke at least 24hrs before your surgery.       Follow the pre surgery showering instructions as  listed in the “My Surgical Experience Booklet” or otherwise provided by your surgeon's office. Do not use a blade to shave the surgical area 1 week before surgery. It is okay to use a clean electric clippers up to 24 hours before surgery. Do not apply any lotions, creams, including makeup, cologne, deodorant, or perfumes after showering on the day of your surgery. Do not use dry shampoo, hair spray, hair gel, or any type of hair products.     No contact lenses, eye make-up, or artificial eyelashes. Remove nail polish, including gel polish, and any artificial, gel, or acrylic nails if possible. Remove all jewelry including rings and body piercing jewelry.     Wear causal clothing that is easy to take on and off. Consider your type of surgery.    Keep any valuables, jewelry, piercings at home. Please bring any specially ordered equipment (sling, braces) if indicated.    Arrange for a responsible person to drive you to and from the hospital on the day of your surgery. Please confirm the visitor policy for the day of your procedure when you receive your phone call with an arrival time.     Call the surgeon's office with any new illnesses, exposures, or additional questions prior to surgery.    Please reference your “My Surgical Experience Booklet” for additional information to prepare for your upcoming surgery.

## 2024-07-21 NOTE — ANESTHESIA PREPROCEDURE EVALUATION
Procedure:  HYSTEROSCOPY, D&C, NOVASURE ABLATION (Uterus)    Relevant Problems   No relevant active problems      HTN    OSAS    GERD    VAPING    6/2024-nl coronaries    LBBB    Physical Exam    Airway    Mallampati score: II  TM Distance: >3 FB  Neck ROM: full     Dental   No notable dental hx     Cardiovascular  Cardiovascular exam normal    Pulmonary  Pulmonary exam normal     Other Findings  post-pubertal.      Anesthesia Plan  ASA Score- 3     Anesthesia Type- IV sedation with anesthesia with ASA Monitors.         Additional Monitors:     Airway Plan:            Plan Factors-Exercise tolerance (METS): >4 METS.    Chart reviewed.  Imaging results reviewed. Existing labs reviewed. Patient summary reviewed.    Patient is a current smoker.              Induction- intravenous.    Postoperative Plan-     Perioperative Resuscitation Plan - Level 1 - Full Code.       Informed Consent- Anesthetic plan and risks discussed with patient.  I personally reviewed this patient with the CRNA. Discussed and agreed on the Anesthesia Plan with the CRNA..

## 2024-07-22 ENCOUNTER — ANESTHESIA (OUTPATIENT)
Dept: PERIOP | Facility: HOSPITAL | Age: 49
End: 2024-07-22
Payer: COMMERCIAL

## 2024-07-22 ENCOUNTER — HOSPITAL ENCOUNTER (OUTPATIENT)
Facility: HOSPITAL | Age: 49
Setting detail: OUTPATIENT SURGERY
Discharge: HOME | End: 2024-07-22
Attending: OBSTETRICS & GYNECOLOGY | Admitting: OBSTETRICS & GYNECOLOGY
Payer: COMMERCIAL

## 2024-07-22 VITALS
HEART RATE: 94 BPM | SYSTOLIC BLOOD PRESSURE: 136 MMHG | HEIGHT: 68 IN | RESPIRATION RATE: 20 BRPM | BODY MASS INDEX: 36.37 KG/M2 | TEMPERATURE: 98.8 F | OXYGEN SATURATION: 93 % | WEIGHT: 240 LBS | DIASTOLIC BLOOD PRESSURE: 77 MMHG

## 2024-07-22 LAB
EXT PREGNANCY TEST URINE: NEGATIVE
EXT. CONTROL: NORMAL

## 2024-07-22 PROCEDURE — 81025 URINE PREGNANCY TEST: CPT | Performed by: OBSTETRICS & GYNECOLOGY

## 2024-07-22 RX ORDER — KETOROLAC TROMETHAMINE 30 MG/ML
INJECTION, SOLUTION INTRAMUSCULAR; INTRAVENOUS AS NEEDED
Status: DISCONTINUED | OUTPATIENT
Start: 2024-07-22 | End: 2024-07-22

## 2024-07-22 RX ORDER — LIDOCAINE HYDROCHLORIDE 10 MG/ML
INJECTION, SOLUTION EPIDURAL; INFILTRATION; INTRACAUDAL; PERINEURAL AS NEEDED
Status: DISCONTINUED | OUTPATIENT
Start: 2024-07-22 | End: 2024-07-22

## 2024-07-22 RX ORDER — FENTANYL CITRATE 50 UG/ML
INJECTION, SOLUTION INTRAMUSCULAR; INTRAVENOUS AS NEEDED
Status: DISCONTINUED | OUTPATIENT
Start: 2024-07-22 | End: 2024-07-22

## 2024-07-22 RX ORDER — ONDANSETRON 2 MG/ML
INJECTION INTRAMUSCULAR; INTRAVENOUS AS NEEDED
Status: DISCONTINUED | OUTPATIENT
Start: 2024-07-22 | End: 2024-07-22

## 2024-07-22 RX ORDER — MAGNESIUM HYDROXIDE 1200 MG/15ML
LIQUID ORAL AS NEEDED
Status: DISCONTINUED | OUTPATIENT
Start: 2024-07-22 | End: 2024-07-22 | Stop reason: HOSPADM

## 2024-07-22 RX ORDER — MIDAZOLAM HYDROCHLORIDE 2 MG/2ML
INJECTION, SOLUTION INTRAMUSCULAR; INTRAVENOUS AS NEEDED
Status: DISCONTINUED | OUTPATIENT
Start: 2024-07-22 | End: 2024-07-22

## 2024-07-22 RX ORDER — ACETAMINOPHEN 325 MG/1
975 TABLET ORAL ONCE
Status: COMPLETED | OUTPATIENT
Start: 2024-07-22 | End: 2024-07-22

## 2024-07-22 RX ORDER — FENTANYL CITRATE/PF 50 MCG/ML
50 SYRINGE (ML) INJECTION
Status: DISCONTINUED | OUTPATIENT
Start: 2024-07-22 | End: 2024-07-22 | Stop reason: HOSPADM

## 2024-07-22 RX ORDER — PROPOFOL 10 MG/ML
INJECTION, EMULSION INTRAVENOUS AS NEEDED
Status: DISCONTINUED | OUTPATIENT
Start: 2024-07-22 | End: 2024-07-22

## 2024-07-22 RX ORDER — SODIUM CHLORIDE, SODIUM LACTATE, POTASSIUM CHLORIDE, CALCIUM CHLORIDE 600; 310; 30; 20 MG/100ML; MG/100ML; MG/100ML; MG/100ML
INJECTION, SOLUTION INTRAVENOUS CONTINUOUS PRN
Status: DISCONTINUED | OUTPATIENT
Start: 2024-07-22 | End: 2024-07-22

## 2024-07-22 RX ORDER — ALBUTEROL SULFATE 2.5 MG/3ML
2.5 SOLUTION RESPIRATORY (INHALATION) ONCE
Status: COMPLETED | OUTPATIENT
Start: 2024-07-22 | End: 2024-07-22

## 2024-07-22 RX ADMIN — ACETAMINOPHEN 325MG 975 MG: 325 TABLET ORAL at 13:55

## 2024-07-22 RX ADMIN — ONDANSETRON 4 MG: 2 INJECTION INTRAMUSCULAR; INTRAVENOUS at 15:38

## 2024-07-22 RX ADMIN — MIDAZOLAM 2 MG: 1 INJECTION INTRAMUSCULAR; INTRAVENOUS at 14:11

## 2024-07-22 RX ADMIN — FENTANYL CITRATE 25 MCG: 50 INJECTION INTRAMUSCULAR; INTRAVENOUS at 15:46

## 2024-07-22 RX ADMIN — LIDOCAINE HYDROCHLORIDE 50 MG: 10 INJECTION, SOLUTION EPIDURAL; INFILTRATION; INTRACAUDAL; PERINEURAL at 15:38

## 2024-07-22 RX ADMIN — KETOROLAC TROMETHAMINE 30 MG: 30 INJECTION, SOLUTION INTRAMUSCULAR at 15:54

## 2024-07-22 RX ADMIN — FENTANYL CITRATE 25 MCG: 50 INJECTION INTRAMUSCULAR; INTRAVENOUS at 15:38

## 2024-07-22 RX ADMIN — PROPOFOL 150 MCG/KG/MIN: 10 INJECTION, EMULSION INTRAVENOUS at 15:39

## 2024-07-22 RX ADMIN — PROPOFOL 50 MG: 10 INJECTION, EMULSION INTRAVENOUS at 15:40

## 2024-07-22 RX ADMIN — SODIUM CHLORIDE, SODIUM LACTATE, POTASSIUM CHLORIDE, AND CALCIUM CHLORIDE: .6; .31; .03; .02 INJECTION, SOLUTION INTRAVENOUS at 15:18

## 2024-07-22 RX ADMIN — ALBUTEROL SULFATE 2.5 MG: 2.5 SOLUTION RESPIRATORY (INHALATION) at 13:56

## 2024-07-22 RX ADMIN — PROPOFOL 50 MG: 10 INJECTION, EMULSION INTRAVENOUS at 15:46

## 2024-07-22 RX ADMIN — PROPOFOL 50 MG: 10 INJECTION, EMULSION INTRAVENOUS at 15:38

## 2024-07-22 NOTE — OP NOTE
OPERATIVE REPORT  PATIENT NAME: Sunshine Gupta    :  1975  MRN: [id number]  Pt Location: [facility] OR ROOM 02    SURGERY DATE: 2024    Surgeons and Role:     * Fransisco Bender, DO - Primary    Preop Diagnosis:  Excessive and frequent menstruation with regular cycle [N92.0]    Post-Op Diagnosis Codes:     * Excessive and frequent menstruation with regular cycle [N92.0]    Procedure(s):  HYSTEROSCOPY. D&C. NOVASURE ABLATION    Specimen(s):  None    Estimated Blood Loss:   Minimal    Anesthesia Type:   IV Sedation with Anesthesia    Operative Indications:  Excessive and frequent menstruation with regular cycle [N92.0]  Heavy menstrual bleeding resistant to conservative therapy.    Operative Findings:  There was a large amount of endometrial tissue identified with no masses noted.  The apex of the cavity was visualized and the cavity was visualized post ablation.      Complications:   None    Procedure and Technique:  After informed consent was obtained the patient was taken to the operating room and identified in the appropriate fashion.  The patient was placed in the supine position was placed under anesthesia and was converted to the dorsolithotomy position without difficulty.  The patient was prepped and draped in a sterile fashion and a weighted speculum was placed in the posterior vaginal wall and a narrow Phelan retractor in the anterior vaginal wall in order to visualize the cervix.  The cervix was grasped with a ring forcep and the uterus was then sounded and the cervix was serially dilated without difficulty to accommodate the hysteroscope and the ablation wand.  The hysteroscope was then primed and was placed through the cervix into the uterus and the uterine cavity was completely visualized.  The hysteroscope was then removed and a medium size sharp curette was placed through the cervix into the uterus and the uterine cavity was completely curetted into the entire cavity was sent was sampled.  The  curettings were discarded.  The ablation wand was then appropriately set was placed through the cervix into the uterus and was seated in the typical fashion.  After seating the device the cavity integrity was tested and the device was then activated.  The ablation lasted for 47 seconds at which time it turned off on its own.  The wand was then removed and the hysteroscope was then placed back into the uterine cavity with no new abnormal findings.  The hysteroscope was removed and the ring forcep was removed from the cervix.  The cervix and vagina were hemostatic.  The retractors were removed and the patient was cleaned and brought up from anesthesia without difficulty.  The patient tolerated the procedure well and was stable postoperatively.  Sponge needle and instrument counts were correct x 2.  The patient was taken to the recovery room in stable condition.   I was present for the entire procedure.    Patient Disposition:  PACU         SIGNATURE: Fransisco Bender DO  DATE: July 22, 2024  TIME: 3:57 PM

## 2024-07-22 NOTE — ANESTHESIA POSTPROCEDURE EVALUATION
Post-Op Assessment Note    CV Status:  Stable    Pain management: adequate    Multimodal analgesia used between 6 hours prior to anesthesia start to PACU discharge    Mental Status:  Alert and awake   Hydration Status:  Euvolemic   PONV Controlled:  Controlled   Airway Patency:  Patent     Post Op Vitals Reviewed: Yes    No anethesia notable event occurred.    Staff: CRNA               BP   106/56   Temp   97.5   Pulse  72   Resp   16   SpO2   93

## 2024-07-22 NOTE — H&P
This updated H&P from the previous H&P previous H&P of June 24, 2024 there are no changes to the previous history and physical.  The patient was seen in the office status post diagnostic hysteroscopy dilation and curettage and was scheduled for a NovaSure ablation.  The past medical history surgical history and other remaining history was all unchanged and the physical exam was unchanged as well.    Assessment menorrhagia    Plan the patient will undergo a diagnostic hysteroscopy dilation and curettage and NovaSure ablation procedure.  The risk benefits and side effects of the procedure were discussed with the patient at length both in the office and preoperatively.  The consent form was signed and the patient will follow-up in the office 2 weeks postoperatively

## 2024-12-05 ENCOUNTER — HOSPITAL ENCOUNTER (OUTPATIENT)
Dept: RADIOLOGY | Facility: CLINIC | Age: 49
End: 2024-12-05
Payer: COMMERCIAL

## 2024-12-05 VITALS — BODY MASS INDEX: 37.13 KG/M2 | HEIGHT: 68 IN | WEIGHT: 245 LBS

## 2024-12-05 DIAGNOSIS — Z12.31 ENCOUNTER FOR SCREENING MAMMOGRAM FOR MALIGNANT NEOPLASM OF BREAST: ICD-10-CM

## 2024-12-05 PROCEDURE — 77067 SCR MAMMO BI INCL CAD: CPT

## 2024-12-05 PROCEDURE — 77063 BREAST TOMOSYNTHESIS BI: CPT

## 2025-02-19 ENCOUNTER — HOSPITAL ENCOUNTER (OUTPATIENT)
Dept: RADIOLOGY | Facility: CLINIC | Age: 50
Discharge: HOME | End: 2025-02-19
Payer: COMMERCIAL

## 2025-02-19 VITALS — WEIGHT: 245 LBS | HEIGHT: 68 IN | BODY MASS INDEX: 37.13 KG/M2

## 2025-02-19 DIAGNOSIS — R92.8 ABNORMAL SCREENING MAMMOGRAM: ICD-10-CM

## 2025-02-19 PROCEDURE — G0279 TOMOSYNTHESIS, MAMMO: HCPCS

## 2025-02-19 PROCEDURE — 77065 DX MAMMO INCL CAD UNI: CPT

## 2025-02-19 PROCEDURE — 76642 ULTRASOUND BREAST LIMITED: CPT

## (undated) DEVICE — TISSUE REMOVAL SYSTEM FLUID MANAGEMENT ACCESSORIES: Brand: SYMPHION

## (undated) DEVICE — GLOVE INDICATOR PI UNDERGLOVE SZ 8 BLUE

## (undated) DEVICE — WET SKIN PREP TRAY: Brand: MEDLINE INDUSTRIES, INC.

## (undated) DEVICE — MAXI PAD5.51 X 13.78 IN. (14.0 X 35.0 CM)HEAVYCONTOUREDUNSCENTED: Brand: CURITY

## (undated) DEVICE — PREMIUM DRY TRAY LF: Brand: MEDLINE INDUSTRIES, INC.

## (undated) DEVICE — NOVASURE V5

## (undated) DEVICE — MAYO STAND COVER: Brand: CONVERTORS

## (undated) DEVICE — GLOVE SRG BIOGEL ECLIPSE 7.5

## (undated) DEVICE — DRAPE,UNDERBUTTOCKS,PCH,STERILE: Brand: MEDLINE

## (undated) DEVICE — THREE-QUARTER SHEET: Brand: CONVERTORS

## (undated) DEVICE — STRL ALLENTOWN HYSTEROSCOPY PK: Brand: CARDINAL HEALTH